# Patient Record
Sex: FEMALE | Race: OTHER | HISPANIC OR LATINO
[De-identification: names, ages, dates, MRNs, and addresses within clinical notes are randomized per-mention and may not be internally consistent; named-entity substitution may affect disease eponyms.]

---

## 2018-02-09 ENCOUNTER — APPOINTMENT (OUTPATIENT)
Dept: RADIOLOGY | Facility: CLINIC | Age: 48
End: 2018-02-09
Payer: COMMERCIAL

## 2018-02-09 ENCOUNTER — OUTPATIENT (OUTPATIENT)
Dept: OUTPATIENT SERVICES | Facility: HOSPITAL | Age: 48
LOS: 1 days | End: 2018-02-09
Payer: COMMERCIAL

## 2018-02-09 DIAGNOSIS — Z00.8 ENCOUNTER FOR OTHER GENERAL EXAMINATION: ICD-10-CM

## 2018-02-09 PROCEDURE — 73564 X-RAY EXAM KNEE 4 OR MORE: CPT

## 2018-02-09 PROCEDURE — 73564 X-RAY EXAM KNEE 4 OR MORE: CPT | Mod: 26,RT

## 2018-03-16 ENCOUNTER — APPOINTMENT (OUTPATIENT)
Dept: MRI IMAGING | Facility: CLINIC | Age: 48
End: 2018-03-16
Payer: COMMERCIAL

## 2018-03-16 ENCOUNTER — OUTPATIENT (OUTPATIENT)
Dept: OUTPATIENT SERVICES | Facility: HOSPITAL | Age: 48
LOS: 1 days | End: 2018-03-16
Payer: COMMERCIAL

## 2018-03-16 DIAGNOSIS — Z00.8 ENCOUNTER FOR OTHER GENERAL EXAMINATION: ICD-10-CM

## 2018-03-16 PROCEDURE — 73721 MRI JNT OF LWR EXTRE W/O DYE: CPT | Mod: 26,RT

## 2018-03-16 PROCEDURE — 73721 MRI JNT OF LWR EXTRE W/O DYE: CPT

## 2018-04-06 ENCOUNTER — RESULT REVIEW (OUTPATIENT)
Age: 48
End: 2018-04-06

## 2018-05-04 ENCOUNTER — APPOINTMENT (OUTPATIENT)
Dept: ULTRASOUND IMAGING | Facility: CLINIC | Age: 48
End: 2018-05-04
Payer: COMMERCIAL

## 2018-05-04 ENCOUNTER — OUTPATIENT (OUTPATIENT)
Dept: OUTPATIENT SERVICES | Facility: HOSPITAL | Age: 48
LOS: 1 days | End: 2018-05-04
Payer: COMMERCIAL

## 2018-05-04 ENCOUNTER — APPOINTMENT (OUTPATIENT)
Dept: MAMMOGRAPHY | Facility: CLINIC | Age: 48
End: 2018-05-04
Payer: COMMERCIAL

## 2018-05-04 DIAGNOSIS — Z00.8 ENCOUNTER FOR OTHER GENERAL EXAMINATION: ICD-10-CM

## 2018-05-04 PROCEDURE — 77066 DX MAMMO INCL CAD BI: CPT

## 2018-05-04 PROCEDURE — 76641 ULTRASOUND BREAST COMPLETE: CPT

## 2018-05-04 PROCEDURE — 77066 DX MAMMO INCL CAD BI: CPT | Mod: 26

## 2018-05-04 PROCEDURE — G0279: CPT | Mod: 26

## 2018-05-04 PROCEDURE — 76641 ULTRASOUND BREAST COMPLETE: CPT | Mod: 26,LT

## 2018-05-04 PROCEDURE — G0279: CPT

## 2020-05-05 ENCOUNTER — TRANSCRIPTION ENCOUNTER (OUTPATIENT)
Age: 50
End: 2020-05-05

## 2021-10-30 ENCOUNTER — TRANSCRIPTION ENCOUNTER (OUTPATIENT)
Age: 51
End: 2021-10-30

## 2021-10-30 ENCOUNTER — INPATIENT (INPATIENT)
Facility: HOSPITAL | Age: 51
LOS: 4 days | Discharge: HOME | DRG: 137 | End: 2021-11-04
Attending: INTERNAL MEDICINE | Admitting: FAMILY MEDICINE
Payer: MEDICAID

## 2021-10-30 VITALS — WEIGHT: 149.91 LBS | HEIGHT: 65 IN

## 2021-10-30 DIAGNOSIS — U07.1 COVID-19: ICD-10-CM

## 2021-10-30 LAB
ALBUMIN SERPL ELPH-MCNC: 3.4 G/DL — SIGNIFICANT CHANGE UP (ref 3.3–5)
ALP SERPL-CCNC: 116 U/L — SIGNIFICANT CHANGE UP (ref 40–120)
ALT FLD-CCNC: 160 U/L — HIGH (ref 12–78)
ANION GAP SERPL CALC-SCNC: 6 MMOL/L — SIGNIFICANT CHANGE UP (ref 5–17)
APPEARANCE UR: CLEAR — SIGNIFICANT CHANGE UP
AST SERPL-CCNC: 125 U/L — HIGH (ref 15–37)
BASOPHILS # BLD AUTO: 0.02 K/UL — SIGNIFICANT CHANGE UP (ref 0–0.2)
BASOPHILS NFR BLD AUTO: 0.3 % — SIGNIFICANT CHANGE UP (ref 0–2)
BILIRUB SERPL-MCNC: 0.4 MG/DL — SIGNIFICANT CHANGE UP (ref 0.2–1.2)
BILIRUB UR-MCNC: NEGATIVE — SIGNIFICANT CHANGE UP
BUN SERPL-MCNC: 5 MG/DL — LOW (ref 7–23)
CALCIUM SERPL-MCNC: 9 MG/DL — SIGNIFICANT CHANGE UP (ref 8.5–10.1)
CHLORIDE SERPL-SCNC: 107 MMOL/L — SIGNIFICANT CHANGE UP (ref 96–108)
CO2 SERPL-SCNC: 25 MMOL/L — SIGNIFICANT CHANGE UP (ref 22–31)
COLOR SPEC: YELLOW — SIGNIFICANT CHANGE UP
CREAT SERPL-MCNC: 0.81 MG/DL — SIGNIFICANT CHANGE UP (ref 0.5–1.3)
CRP SERPL-MCNC: 142 MG/L — HIGH
D DIMER BLD IA.RAPID-MCNC: <150 NG/ML DDU — SIGNIFICANT CHANGE UP
DIFF PNL FLD: NEGATIVE — SIGNIFICANT CHANGE UP
EOSINOPHIL # BLD AUTO: 0 K/UL — SIGNIFICANT CHANGE UP (ref 0–0.5)
EOSINOPHIL NFR BLD AUTO: 0 % — SIGNIFICANT CHANGE UP (ref 0–6)
FERRITIN SERPL-MCNC: 1728 NG/ML — HIGH (ref 15–150)
GLUCOSE SERPL-MCNC: 118 MG/DL — HIGH (ref 70–99)
GLUCOSE UR QL: 50 MG/DL
HCT VFR BLD CALC: 45.5 % — HIGH (ref 34.5–45)
HGB BLD-MCNC: 14.8 G/DL — SIGNIFICANT CHANGE UP (ref 11.5–15.5)
IMM GRANULOCYTES NFR BLD AUTO: 0.7 % — SIGNIFICANT CHANGE UP (ref 0–1.5)
KETONES UR-MCNC: NEGATIVE — SIGNIFICANT CHANGE UP
LEUKOCYTE ESTERASE UR-ACNC: NEGATIVE — SIGNIFICANT CHANGE UP
LYMPHOCYTES # BLD AUTO: 0.52 K/UL — LOW (ref 1–3.3)
LYMPHOCYTES # BLD AUTO: 7.2 % — LOW (ref 13–44)
MCHC RBC-ENTMCNC: 26.7 PG — LOW (ref 27–34)
MCHC RBC-ENTMCNC: 32.5 GM/DL — SIGNIFICANT CHANGE UP (ref 32–36)
MCV RBC AUTO: 82.1 FL — SIGNIFICANT CHANGE UP (ref 80–100)
MONOCYTES # BLD AUTO: 0.54 K/UL — SIGNIFICANT CHANGE UP (ref 0–0.9)
MONOCYTES NFR BLD AUTO: 7.5 % — SIGNIFICANT CHANGE UP (ref 2–14)
NEUTROPHILS # BLD AUTO: 6.05 K/UL — SIGNIFICANT CHANGE UP (ref 1.8–7.4)
NEUTROPHILS NFR BLD AUTO: 84.3 % — HIGH (ref 43–77)
NITRITE UR-MCNC: NEGATIVE — SIGNIFICANT CHANGE UP
PH UR: 7 — SIGNIFICANT CHANGE UP (ref 5–8)
PLATELET # BLD AUTO: 139 K/UL — LOW (ref 150–400)
POTASSIUM SERPL-MCNC: 4.2 MMOL/L — SIGNIFICANT CHANGE UP (ref 3.5–5.3)
POTASSIUM SERPL-SCNC: 4.2 MMOL/L — SIGNIFICANT CHANGE UP (ref 3.5–5.3)
PROCALCITONIN SERPL-MCNC: 0.44 NG/ML — HIGH (ref 0.02–0.1)
PROT SERPL-MCNC: 8.4 GM/DL — HIGH (ref 6–8.3)
PROT UR-MCNC: 30 MG/DL
RBC # BLD: 5.54 M/UL — HIGH (ref 3.8–5.2)
RBC # FLD: 12.7 % — SIGNIFICANT CHANGE UP (ref 10.3–14.5)
SODIUM SERPL-SCNC: 138 MMOL/L — SIGNIFICANT CHANGE UP (ref 135–145)
SP GR SPEC: 1 — LOW (ref 1.01–1.02)
UROBILINOGEN FLD QL: NEGATIVE MG/DL — SIGNIFICANT CHANGE UP
WBC # BLD: 7.18 K/UL — SIGNIFICANT CHANGE UP (ref 3.8–10.5)
WBC # FLD AUTO: 7.18 K/UL — SIGNIFICANT CHANGE UP (ref 3.8–10.5)

## 2021-10-30 PROCEDURE — 80069 RENAL FUNCTION PANEL: CPT

## 2021-10-30 PROCEDURE — 83036 HEMOGLOBIN GLYCOSYLATED A1C: CPT

## 2021-10-30 PROCEDURE — 86769 SARS-COV-2 COVID-19 ANTIBODY: CPT

## 2021-10-30 PROCEDURE — 83615 LACTATE (LD) (LDH) ENZYME: CPT

## 2021-10-30 PROCEDURE — 99223 1ST HOSP IP/OBS HIGH 75: CPT

## 2021-10-30 PROCEDURE — 85025 COMPLETE CBC W/AUTO DIFF WBC: CPT

## 2021-10-30 PROCEDURE — 85379 FIBRIN DEGRADATION QUANT: CPT

## 2021-10-30 PROCEDURE — 86140 C-REACTIVE PROTEIN: CPT

## 2021-10-30 PROCEDURE — 80076 HEPATIC FUNCTION PANEL: CPT

## 2021-10-30 PROCEDURE — 82728 ASSAY OF FERRITIN: CPT

## 2021-10-30 PROCEDURE — 80048 BASIC METABOLIC PNL TOTAL CA: CPT

## 2021-10-30 PROCEDURE — 93010 ELECTROCARDIOGRAM REPORT: CPT

## 2021-10-30 PROCEDURE — C9399: CPT

## 2021-10-30 PROCEDURE — 36415 COLL VENOUS BLD VENIPUNCTURE: CPT

## 2021-10-30 PROCEDURE — 71045 X-RAY EXAM CHEST 1 VIEW: CPT | Mod: 26

## 2021-10-30 PROCEDURE — 82565 ASSAY OF CREATININE: CPT

## 2021-10-30 PROCEDURE — 80053 COMPREHEN METABOLIC PANEL: CPT

## 2021-10-30 PROCEDURE — 85610 PROTHROMBIN TIME: CPT

## 2021-10-30 PROCEDURE — 99291 CRITICAL CARE FIRST HOUR: CPT

## 2021-10-30 PROCEDURE — 84145 PROCALCITONIN (PCT): CPT

## 2021-10-30 PROCEDURE — 82248 BILIRUBIN DIRECT: CPT

## 2021-10-30 RX ORDER — ENOXAPARIN SODIUM 100 MG/ML
70 INJECTION SUBCUTANEOUS EVERY 12 HOURS
Refills: 0 | Status: DISCONTINUED | OUTPATIENT
Start: 2021-10-30 | End: 2021-10-31

## 2021-10-30 RX ORDER — REMDESIVIR 5 MG/ML
200 INJECTION INTRAVENOUS EVERY 24 HOURS
Refills: 0 | Status: COMPLETED | OUTPATIENT
Start: 2021-10-30 | End: 2021-10-30

## 2021-10-30 RX ORDER — GUAIFENESIN/DEXTROMETHORPHAN 600MG-30MG
10 TABLET, EXTENDED RELEASE 12 HR ORAL EVERY 4 HOURS
Refills: 0 | Status: DISCONTINUED | OUTPATIENT
Start: 2021-10-30 | End: 2021-11-04

## 2021-10-30 RX ORDER — REMDESIVIR 5 MG/ML
INJECTION INTRAVENOUS
Refills: 0 | Status: COMPLETED | OUTPATIENT
Start: 2021-10-30 | End: 2021-11-03

## 2021-10-30 RX ORDER — ACETAMINOPHEN 500 MG
650 TABLET ORAL EVERY 6 HOURS
Refills: 0 | Status: DISCONTINUED | OUTPATIENT
Start: 2021-10-30 | End: 2021-11-04

## 2021-10-30 RX ORDER — REMDESIVIR 5 MG/ML
100 INJECTION INTRAVENOUS EVERY 24 HOURS
Refills: 0 | Status: COMPLETED | OUTPATIENT
Start: 2021-10-31 | End: 2021-11-03

## 2021-10-30 RX ORDER — ALBUTEROL 90 UG/1
2 AEROSOL, METERED ORAL EVERY 4 HOURS
Refills: 0 | Status: DISCONTINUED | OUTPATIENT
Start: 2021-10-30 | End: 2021-11-04

## 2021-10-30 RX ORDER — DEXAMETHASONE 0.5 MG/5ML
6 ELIXIR ORAL DAILY
Refills: 0 | Status: DISCONTINUED | OUTPATIENT
Start: 2021-10-30 | End: 2021-11-04

## 2021-10-30 RX ORDER — LANOLIN ALCOHOL/MO/W.PET/CERES
3 CREAM (GRAM) TOPICAL AT BEDTIME
Refills: 0 | Status: DISCONTINUED | OUTPATIENT
Start: 2021-10-30 | End: 2021-11-04

## 2021-10-30 RX ORDER — INFLUENZA VIRUS VACCINE 15; 15; 15; 15 UG/.5ML; UG/.5ML; UG/.5ML; UG/.5ML
0.5 SUSPENSION INTRAMUSCULAR ONCE
Refills: 0 | Status: DISCONTINUED | OUTPATIENT
Start: 2021-10-30 | End: 2021-11-04

## 2021-10-30 RX ORDER — DEXAMETHASONE 0.5 MG/5ML
6 ELIXIR ORAL ONCE
Refills: 0 | Status: COMPLETED | OUTPATIENT
Start: 2021-10-30 | End: 2021-10-30

## 2021-10-30 RX ORDER — ONDANSETRON 8 MG/1
4 TABLET, FILM COATED ORAL EVERY 8 HOURS
Refills: 0 | Status: DISCONTINUED | OUTPATIENT
Start: 2021-10-30 | End: 2021-11-04

## 2021-10-30 RX ADMIN — ENOXAPARIN SODIUM 70 MILLIGRAM(S): 100 INJECTION SUBCUTANEOUS at 22:09

## 2021-10-30 RX ADMIN — Medication 6 MILLIGRAM(S): at 18:03

## 2021-10-30 RX ADMIN — REMDESIVIR 580 MILLIGRAM(S): 5 INJECTION INTRAVENOUS at 22:09

## 2021-10-30 NOTE — H&P ADULT - ASSESSMENT
50 yo female with no significant PMHx. who was referred to the ER from Urgent care because of hypoxia SOB, She ws diagnosed with  COVID 19 , 10 days ago. She became increasingly SOB in the past 3 days.     assessment Dx:                       1. COVID 19 pneumonia                       2. COVID 19 infection                        3. Hypoxemic respiratory failure                                                 Plan:    admit to medicine               medications: started on COVID 19 management protocol with Remdesivir decadron, Lovenox               VTEP: Lovenox               Labs: cbc,bmp               Radiology:CXRay               Cardiac diagnostics: EKG               Consults: ID

## 2021-10-30 NOTE — ED PROVIDER NOTE - CONSTITUTIONAL, MLM
normal... Well appearing, awake, alert, oriented to person, place, time/situation and in no apparent distress. (+) 85% O2 sat on RA

## 2021-10-30 NOTE — ED ADULT NURSE NOTE - NSIMPLEMENTINTERV_GEN_ALL_ED
Implemented All Universal Safety Interventions:  [place] to call system. Call bell, personal items and telephone within reach. Instruct patient to call for assistance. Room bathroom lighting operational. Non-slip footwear when patient is off stretcher. Physically safe environment: no spills, clutter or unnecessary equipment. Stretcher in lowest position, wheels locked, appropriate side rails in place.

## 2021-10-30 NOTE — ED ADULT NURSE NOTE - OBJECTIVE STATEMENT
patient axox3, sent to  ED from urgent care c/o SOB, worsening. Pt was diagnosed with COVID x10 days. Pt reports her fever has abated but has become increasingly SOB over past 3 days. Pt has taken Tylenol and Theraflu for symptoms at home. Pt has not received monoclonal antibodies. Pt was sent to ED from urgent care because her O2 was low. Denies CP and headache. patient axox3, sent to  ED from urgent care c/o worsening SOB. patient was diagnosed with COVID x10 days ago. patient denies recent fevers but has become increasingly SOB over past 3 days. patient taking Tylenol and Theraflu for symptoms at home. patient denies headache, vision changes, n/v/d, chills, cough, chest pain. color good, skin warm and dry, respirations even and unlabored. patient able to speak in full sentences.

## 2021-10-30 NOTE — ED PROVIDER NOTE - OBJECTIVE STATEMENT
52 y/o F with no pertinent PMHx presents to the ED BIBA from urgent care c/o SOB, worsening. Pt was diagnosed with COVID x10 days. Pt reports her fever has abated but has become increasingly SOB over past 3 days. Pt has taken Tylenol and Theraflu for symptoms at home. Pt has not received monoclonal antibodies. Pt was sent to ED from urgent care because her O2 was low. Denies CP and headache.

## 2021-10-30 NOTE — ED PROVIDER NOTE - CARE PLAN
Principal Discharge DX:	Pneumonia due to COVID-19 virus  Secondary Diagnosis:	Hypoxemia  Secondary Diagnosis:	Dyspnea   1

## 2021-10-30 NOTE — H&P ADULT - HISTORY OF PRESENT ILLNESS
HPI: The patient is a 50 yo female with no significant PMHx. who was referred to the ER from Urgent care because of hypoxia SOB, She ws diagnosed with  COVID 19 , 10 days ago. She became increasingly SOB in the past 3 days.     PMHx: denies medical problems    PSHx: denies surgeries     Family Hx: denies chronic medical Hx. of her parents, sisters,    Social Hx.: not smoking, no alcohol use    ROS:   Eyes: no changes in vision    ENT/Mouth: no changes    Cardiovascular: no chest pain    Respiratory: has  SOB    Gastrointestinal: no diarrhea, no nausea, no vomiting    Genitourinary: no dysuria    Breast: no pain    Musculoskeletal: no pain    Integumentary: no itching    Neurological: No Headache, no tremor,    Psychiatric: no suicidal ideations    Endocrine: no excessive thirst,     Hematologic/Lymphatic: no swollen glands    Allergic/Immunologic: no itching      Physical Exam: Vital Signs Last 24 Hrs  T(C): 37.8 (30 Oct 2021 15:39), Max: 37.8 (30 Oct 2021 15:39)  T(F): 100.1 (30 Oct 2021 15:39), Max: 100.1 (30 Oct 2021 15:39)  HR: 99 (30 Oct 2021 15:39) (97 - 99)  BP: 120/72 (30 Oct 2021 15:39) (120/72 - 120/72)  BP(mean): 86 (30 Oct 2021 15:39) (86 - 86)  RR: 25 (30 Oct 2021 15:47) (25 - 27)  SpO2: 93% (30 Oct 2021 15:47) (91% - 99%)        HEENT: PRRL EOMI    MOUTH/TEETH/GUMS: Clear    NECK: no JVD    LUNGS: bibasilar rales R >L     HEART: S1,S2 RR     ABDOMEN: soft nontender    EXTREMITIES:  no pedal edema    MUSCULOSKELETAL: no joint swelling     NEURO: no tremor, no focal signs.    SKIN: no rash    : CVA negative,     Lab:                       14.8   7.18  )-----------( 139      ( 30 Oct 2021 16:09 )             45.5       10-30    138  |  107  |  5<L>  ----------------------------<  118<H>  4.2   |  25  |  0.81    Ca    9.0      30 Oct 2021 16:09    TPro  8.4<H>  /  Alb  3.4  /  TBili  0.4  /  DBili  x   /  AST  125<H>  /  ALT  160<H>  /  AlkPhos  116  10-30    CXRay : scattered bilateral alveolar infiltrates

## 2021-10-31 LAB
A1C WITH ESTIMATED AVERAGE GLUCOSE RESULT: 6.1 % — HIGH (ref 4–5.6)
ANION GAP SERPL CALC-SCNC: 8 MMOL/L — SIGNIFICANT CHANGE UP (ref 5–17)
BASOPHILS # BLD AUTO: 0 K/UL — SIGNIFICANT CHANGE UP (ref 0–0.2)
BASOPHILS NFR BLD AUTO: 0 % — SIGNIFICANT CHANGE UP (ref 0–2)
BUN SERPL-MCNC: 11 MG/DL — SIGNIFICANT CHANGE UP (ref 7–23)
CALCIUM SERPL-MCNC: 8.7 MG/DL — SIGNIFICANT CHANGE UP (ref 8.5–10.1)
CHLORIDE SERPL-SCNC: 107 MMOL/L — SIGNIFICANT CHANGE UP (ref 96–108)
CO2 SERPL-SCNC: 25 MMOL/L — SIGNIFICANT CHANGE UP (ref 22–31)
COVID-19 SPIKE DOMAIN AB INTERP: NEGATIVE — SIGNIFICANT CHANGE UP
COVID-19 SPIKE DOMAIN ANTIBODY RESULT: 0.69 U/ML — SIGNIFICANT CHANGE UP
CREAT SERPL-MCNC: 0.75 MG/DL — SIGNIFICANT CHANGE UP (ref 0.5–1.3)
EOSINOPHIL # BLD AUTO: 0 K/UL — SIGNIFICANT CHANGE UP (ref 0–0.5)
EOSINOPHIL NFR BLD AUTO: 0 % — SIGNIFICANT CHANGE UP (ref 0–6)
ESTIMATED AVERAGE GLUCOSE: 128 MG/DL — HIGH (ref 68–114)
GLUCOSE SERPL-MCNC: 163 MG/DL — HIGH (ref 70–99)
HCT VFR BLD CALC: 41 % — SIGNIFICANT CHANGE UP (ref 34.5–45)
HGB BLD-MCNC: 13.4 G/DL — SIGNIFICANT CHANGE UP (ref 11.5–15.5)
IMM GRANULOCYTES NFR BLD AUTO: 0.4 % — SIGNIFICANT CHANGE UP (ref 0–1.5)
INR BLD: 1.14 RATIO — SIGNIFICANT CHANGE UP (ref 0.88–1.16)
LYMPHOCYTES # BLD AUTO: 0.47 K/UL — LOW (ref 1–3.3)
LYMPHOCYTES # BLD AUTO: 9.6 % — LOW (ref 13–44)
MCHC RBC-ENTMCNC: 26.6 PG — LOW (ref 27–34)
MCHC RBC-ENTMCNC: 32.7 GM/DL — SIGNIFICANT CHANGE UP (ref 32–36)
MCV RBC AUTO: 81.3 FL — SIGNIFICANT CHANGE UP (ref 80–100)
MONOCYTES # BLD AUTO: 0.53 K/UL — SIGNIFICANT CHANGE UP (ref 0–0.9)
MONOCYTES NFR BLD AUTO: 10.8 % — SIGNIFICANT CHANGE UP (ref 2–14)
NEUTROPHILS # BLD AUTO: 3.9 K/UL — SIGNIFICANT CHANGE UP (ref 1.8–7.4)
NEUTROPHILS NFR BLD AUTO: 79.2 % — HIGH (ref 43–77)
PLATELET # BLD AUTO: 167 K/UL — SIGNIFICANT CHANGE UP (ref 150–400)
POTASSIUM SERPL-MCNC: 4.1 MMOL/L — SIGNIFICANT CHANGE UP (ref 3.5–5.3)
POTASSIUM SERPL-SCNC: 4.1 MMOL/L — SIGNIFICANT CHANGE UP (ref 3.5–5.3)
PROTHROM AB SERPL-ACNC: 13.2 SEC — SIGNIFICANT CHANGE UP (ref 10.6–13.6)
RBC # BLD: 5.04 M/UL — SIGNIFICANT CHANGE UP (ref 3.8–5.2)
RBC # FLD: 12.7 % — SIGNIFICANT CHANGE UP (ref 10.3–14.5)
SARS-COV-2 IGG+IGM SERPL QL IA: 0.69 U/ML — SIGNIFICANT CHANGE UP
SARS-COV-2 IGG+IGM SERPL QL IA: NEGATIVE — SIGNIFICANT CHANGE UP
SODIUM SERPL-SCNC: 140 MMOL/L — SIGNIFICANT CHANGE UP (ref 135–145)
WBC # BLD: 4.92 K/UL — SIGNIFICANT CHANGE UP (ref 3.8–10.5)
WBC # FLD AUTO: 4.92 K/UL — SIGNIFICANT CHANGE UP (ref 3.8–10.5)

## 2021-10-31 PROCEDURE — 99233 SBSQ HOSP IP/OBS HIGH 50: CPT

## 2021-10-31 RX ORDER — ENOXAPARIN SODIUM 100 MG/ML
40 INJECTION SUBCUTANEOUS DAILY
Refills: 0 | Status: DISCONTINUED | OUTPATIENT
Start: 2021-10-31 | End: 2021-11-04

## 2021-10-31 RX ORDER — PANTOPRAZOLE SODIUM 20 MG/1
40 TABLET, DELAYED RELEASE ORAL
Refills: 0 | Status: DISCONTINUED | OUTPATIENT
Start: 2021-10-31 | End: 2021-11-04

## 2021-10-31 RX ADMIN — REMDESIVIR 540 MILLIGRAM(S): 5 INJECTION INTRAVENOUS at 22:00

## 2021-10-31 RX ADMIN — ENOXAPARIN SODIUM 40 MILLIGRAM(S): 100 INJECTION SUBCUTANEOUS at 18:18

## 2021-10-31 RX ADMIN — Medication 6 MILLIGRAM(S): at 12:07

## 2021-10-31 RX ADMIN — PANTOPRAZOLE SODIUM 40 MILLIGRAM(S): 20 TABLET, DELAYED RELEASE ORAL at 18:19

## 2021-10-31 NOTE — CONSULT NOTE ADULT - SUBJECTIVE AND OBJECTIVE BOX
Patient is a 51y old  Female who presents with a chief complaint of COVID 19 infection. (30 Oct 2021 19:37)    HPI:  HPI: The patient is a 50 yo female with no significant PMHx. who was referred to the ER from Urgent care because of hypoxia SOB, She ws diagnosed with  COVID 19 , 10 days ago. She became increasingly SOB in the past 3 days. Here bilateral infiltrates on xray, positive for covid-19 was given remdesivir/decadron.       PMH: as above  PSH: as above  Meds: per reconciliation sheet, noted below  MEDICATIONS  (STANDING):  dexAMETHasone  Injectable 6 milliGRAM(s) IV Push daily  enoxaparin Injectable 40 milliGRAM(s) SubCutaneous daily  influenza   Vaccine 0.5 milliLiter(s) IntraMuscular once  pantoprazole    Tablet 40 milliGRAM(s) Oral before breakfast  remdesivir  IVPB 100 milliGRAM(s) IV Intermittent every 24 hours  remdesivir  IVPB   IV Intermittent     Allergies    No Known Allergies    Intolerances      Social: no smoking, no alcohol, no illegal drugs; no recent travel, no exposure to TB  FAMILY HISTORY:     no history of premature cardiovascular disease in first degree relatives    ROS: no HA, no dizziness, no sore throat, no blurry vision, no CP, no palpitations, no abdominal pain, no diarrhea, no N/V, no dysuria, no leg pain, no claudication, no rash, no joint aches, no rectal pain or bleeding, no night sweats    All other systems reviewed and are negative    Vital Signs Last 24 Hrs  T(C): 36.7 (31 Oct 2021 09:36), Max: 37.9 (30 Oct 2021 20:45)  T(F): 98.1 (31 Oct 2021 09:36), Max: 100.3 (30 Oct 2021 20:45)  HR: 81 (31 Oct 2021 09:36) (81 - 99)  BP: 108/63 (31 Oct 2021 09:36) (108/63 - 120/72)  BP(mean): 82 (30 Oct 2021 20:45) (82 - 86)  RR: 19 (31 Oct 2021 09:36) (19 - 27)  SpO2: 98% (30 Oct 2021 21:15) (91% - 99%)  Daily Height in cm: 165.1 (30 Oct 2021 15:08)    Daily     PE:  Constitutional: frail looking  HEENT: NC/AT, EOMI, PERRLA, conjunctivae clear; ears and nose atraumatic; pharynx benign  Neck: supple; thyroid not palpable  Back: no tenderness  Respiratory: decreased breath sounds, crackles   Cardiovascular: S1S2 regular, no murmurs  Abdomen: soft, not tender, not distended, positive BS; liver and spleen WNL  Genitourinary: no suprapubic tenderness  Lymphatic: no LN palpable  Musculoskeletal: no muscle tenderness, no joint swelling or tenderness  Extremities: no pedal edema  Neurological/ Psychiatric: AxOx3, Judgement and insight normal;  moving all extremities  Skin: no rashes; no palpable lesions    Labs: all available labs reviewed                        13.4   4.92  )-----------( 167      ( 31 Oct 2021 07:05 )             41.0     10-31    140  |  107  |  11  ----------------------------<  163<H>  4.1   |  25  |  0.75    Ca    8.7      31 Oct 2021 07:05  Phos  2.9     10-31    TPro  x   /  Alb  3.0<L>  /  TBili  x   /  DBili  x   /  AST  x   /  ALT  x   /  AlkPhos  x   10-31     LIVER FUNCTIONS - ( 31 Oct 2021 07:05 )  Alb: 3.0 g/dL / Pro: x     / ALK PHOS: x     / ALT: x     / AST: x     / GGT: x           Urinalysis Basic - ( 30 Oct 2021 16:09 )    Color: Yellow / Appearance: Clear / S.005 / pH: x  Gluc: x / Ketone: Negative  / Bili: Negative / Urobili: Negative mg/dL   Blood: x / Protein: 30 mg/dL / Nitrite: Negative   Leuk Esterase: Negative / RBC: 0-2 /HPF / WBC 0-2   Sq Epi: x / Non Sq Epi: Occasional / Bacteria: Occasional          Radiology: all available radiological tests reviewed      EXAM:  XR CHEST PORTABLE URGENT 1V                            PROCEDURE DATE:  10/30/2021          INTERPRETATION:  History: Dyspnea cough fever COVID    Chest:  one view.    Comparison: No prior    AP radiograph of the chest demonstrates scatteredBILATERAL alveolar infiltrates. The cardiac silhouette is normal in size. Osseous structures are intact.    Impression:scattered BILATERAL alveolar infiltrates    --- End of Report ---      < end of copied text >    Advanced directives addressed: full resuscitation

## 2021-10-31 NOTE — CONSULT NOTE ADULT - ASSESSMENT
52 yo female with no significant PMHx. who was referred to the ER from Urgent care because of hypoxia SOB, She ws diagnosed with  COVID 19 , 10 days ago. She became increasingly SOB in the past 3 days. Here bilateral infiltrates on xray, positive for covid-19 was given remdesivir/decadron.     1. acute respiratory failure. covid 19 viral syndrome. multifocal pneumonia.   - agree with remdesivir/decadron  - monitor renal/hepatic function closely  - observe off antibiotics   - monitor temps  - isolation precautions  - supportive care  - fu cbc    2. other issues - care per medicine

## 2021-11-01 LAB
ALBUMIN SERPL ELPH-MCNC: 2.8 G/DL — LOW (ref 3.3–5)
ALP SERPL-CCNC: 85 U/L — SIGNIFICANT CHANGE UP (ref 40–120)
ALT FLD-CCNC: 93 U/L — HIGH (ref 12–78)
ANION GAP SERPL CALC-SCNC: 7 MMOL/L — SIGNIFICANT CHANGE UP (ref 5–17)
AST SERPL-CCNC: 59 U/L — HIGH (ref 15–37)
BILIRUB SERPL-MCNC: 0.4 MG/DL — SIGNIFICANT CHANGE UP (ref 0.2–1.2)
BUN SERPL-MCNC: 15 MG/DL — SIGNIFICANT CHANGE UP (ref 7–23)
CALCIUM SERPL-MCNC: 8.5 MG/DL — SIGNIFICANT CHANGE UP (ref 8.5–10.1)
CHLORIDE SERPL-SCNC: 107 MMOL/L — SIGNIFICANT CHANGE UP (ref 96–108)
CO2 SERPL-SCNC: 25 MMOL/L — SIGNIFICANT CHANGE UP (ref 22–31)
CREAT SERPL-MCNC: 0.79 MG/DL — SIGNIFICANT CHANGE UP (ref 0.5–1.3)
CRP SERPL-MCNC: 85 MG/L — HIGH
D DIMER BLD IA.RAPID-MCNC: <150 NG/ML DDU — SIGNIFICANT CHANGE UP
FERRITIN SERPL-MCNC: 1544 NG/ML — HIGH (ref 15–150)
GLUCOSE SERPL-MCNC: 142 MG/DL — HIGH (ref 70–99)
INR BLD: 1.15 RATIO — SIGNIFICANT CHANGE UP (ref 0.88–1.16)
LDH SERPL L TO P-CCNC: 407 U/L — HIGH (ref 84–241)
POTASSIUM SERPL-MCNC: 4.3 MMOL/L — SIGNIFICANT CHANGE UP (ref 3.5–5.3)
POTASSIUM SERPL-SCNC: 4.3 MMOL/L — SIGNIFICANT CHANGE UP (ref 3.5–5.3)
PROT SERPL-MCNC: 6.9 GM/DL — SIGNIFICANT CHANGE UP (ref 6–8.3)
PROTHROM AB SERPL-ACNC: 13.3 SEC — SIGNIFICANT CHANGE UP (ref 10.6–13.6)
SODIUM SERPL-SCNC: 139 MMOL/L — SIGNIFICANT CHANGE UP (ref 135–145)

## 2021-11-01 PROCEDURE — 99233 SBSQ HOSP IP/OBS HIGH 50: CPT

## 2021-11-01 RX ADMIN — Medication 6 MILLIGRAM(S): at 10:25

## 2021-11-01 RX ADMIN — REMDESIVIR 540 MILLIGRAM(S): 5 INJECTION INTRAVENOUS at 21:51

## 2021-11-01 RX ADMIN — PANTOPRAZOLE SODIUM 40 MILLIGRAM(S): 20 TABLET, DELAYED RELEASE ORAL at 05:43

## 2021-11-01 RX ADMIN — ENOXAPARIN SODIUM 40 MILLIGRAM(S): 100 INJECTION SUBCUTANEOUS at 10:20

## 2021-11-02 LAB
ALBUMIN SERPL ELPH-MCNC: 2.9 G/DL — LOW (ref 3.3–5)
ALP SERPL-CCNC: 84 U/L — SIGNIFICANT CHANGE UP (ref 40–120)
ALT FLD-CCNC: 129 U/L — HIGH (ref 12–78)
AST SERPL-CCNC: 83 U/L — HIGH (ref 15–37)
BILIRUB DIRECT SERPL-MCNC: 0.1 MG/DL — SIGNIFICANT CHANGE UP (ref 0–0.2)
BILIRUB INDIRECT FLD-MCNC: 0.3 MG/DL — SIGNIFICANT CHANGE UP (ref 0.2–1)
BILIRUB SERPL-MCNC: 0.4 MG/DL — SIGNIFICANT CHANGE UP (ref 0.2–1.2)
CREAT SERPL-MCNC: 0.89 MG/DL — SIGNIFICANT CHANGE UP (ref 0.5–1.3)
INR BLD: 1.22 RATIO — HIGH (ref 0.88–1.16)
PROT SERPL-MCNC: 6.8 GM/DL — SIGNIFICANT CHANGE UP (ref 6–8.3)
PROTHROM AB SERPL-ACNC: 14.1 SEC — HIGH (ref 10.6–13.6)

## 2021-11-02 PROCEDURE — 99232 SBSQ HOSP IP/OBS MODERATE 35: CPT

## 2021-11-02 RX ADMIN — REMDESIVIR 540 MILLIGRAM(S): 5 INJECTION INTRAVENOUS at 22:41

## 2021-11-02 RX ADMIN — PANTOPRAZOLE SODIUM 40 MILLIGRAM(S): 20 TABLET, DELAYED RELEASE ORAL at 06:23

## 2021-11-02 RX ADMIN — ENOXAPARIN SODIUM 40 MILLIGRAM(S): 100 INJECTION SUBCUTANEOUS at 09:24

## 2021-11-02 RX ADMIN — Medication 6 MILLIGRAM(S): at 09:24

## 2021-11-03 ENCOUNTER — TRANSCRIPTION ENCOUNTER (OUTPATIENT)
Age: 51
End: 2021-11-03

## 2021-11-03 LAB
ALBUMIN SERPL ELPH-MCNC: 3 G/DL — LOW (ref 3.3–5)
ALP SERPL-CCNC: 85 U/L — SIGNIFICANT CHANGE UP (ref 40–120)
ALT FLD-CCNC: 133 U/L — HIGH (ref 12–78)
AST SERPL-CCNC: 70 U/L — HIGH (ref 15–37)
BILIRUB DIRECT SERPL-MCNC: 0.2 MG/DL — SIGNIFICANT CHANGE UP (ref 0–0.2)
BILIRUB INDIRECT FLD-MCNC: 0.4 MG/DL — SIGNIFICANT CHANGE UP (ref 0.2–1)
BILIRUB SERPL-MCNC: 0.6 MG/DL — SIGNIFICANT CHANGE UP (ref 0.2–1.2)
CREAT SERPL-MCNC: 0.87 MG/DL — SIGNIFICANT CHANGE UP (ref 0.5–1.3)
INR BLD: 1.28 RATIO — HIGH (ref 0.88–1.16)
PROT SERPL-MCNC: 6.8 GM/DL — SIGNIFICANT CHANGE UP (ref 6–8.3)
PROTHROM AB SERPL-ACNC: 14.7 SEC — HIGH (ref 10.6–13.6)

## 2021-11-03 PROCEDURE — 99232 SBSQ HOSP IP/OBS MODERATE 35: CPT

## 2021-11-03 RX ADMIN — Medication 650 MILLIGRAM(S): at 22:29

## 2021-11-03 RX ADMIN — PANTOPRAZOLE SODIUM 40 MILLIGRAM(S): 20 TABLET, DELAYED RELEASE ORAL at 06:22

## 2021-11-03 RX ADMIN — REMDESIVIR 540 MILLIGRAM(S): 5 INJECTION INTRAVENOUS at 22:30

## 2021-11-03 RX ADMIN — Medication 650 MILLIGRAM(S): at 23:29

## 2021-11-03 RX ADMIN — Medication 6 MILLIGRAM(S): at 10:33

## 2021-11-03 RX ADMIN — ENOXAPARIN SODIUM 40 MILLIGRAM(S): 100 INJECTION SUBCUTANEOUS at 10:33

## 2021-11-03 NOTE — DISCHARGE NOTE PROVIDER - PROVIDER TOKENS
FREE:[LAST:[.],PHONE:[(   )    -],FAX:[(   )    -],ADDRESS:[Follow up at [facility]]]

## 2021-11-03 NOTE — DISCHARGE NOTE PROVIDER - NSDCCPCAREPLAN_GEN_ALL_CORE_FT
PRINCIPAL DISCHARGE DIAGNOSIS  Diagnosis: Pneumonia due to COVID-19 virus  Assessment and Plan of Treatment:   *Self isolate for 10 days total from symptom onset.   *use your oxygen 24/7 for now until you no longer require it.   *keep your oxygen consistently above 92% while ambulatory and at rest.   *use your pulse oximeter to test your oxgyen frequently. When your oxygen is above 92-93%  WITHOUT THE OXYGEN when walking around, then you can stop using your oxygen support.    *use your albuterol inhlaler when you feels short of breath or wheezy  *use your Tessalon perlles for couging releif.   *Follow up with primary care doctor in one week / or when isolation period is finsihed.   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  *Gradually resume activity over the next 1-2 weeks.        SECONDARY DISCHARGE DIAGNOSES  Diagnosis: Hypoxemia  Assessment and Plan of Treatment:     Diagnosis: Dyspnea  Assessment and Plan of Treatment:

## 2021-11-03 NOTE — DISCHARGE NOTE PROVIDER - NSDCMRMEDTOKEN_GEN_ALL_CORE_FT
aspirin 81 mg oral tablet: 1 tab(s) orally once a day  Theraflu Daytime Severe Cold &amp; Cough oral tablet: 2 tab(s) orally every 4 hours, As Needed  Tylenol 500 mg oral tablet: 2 tab(s) orally every 6 hours

## 2021-11-03 NOTE — DISCHARGE NOTE PROVIDER - CARE PROVIDER_API CALL
.,   Follow up at [facility]  Phone: (   )    -  Fax: (   )    -  Follow Up Time:

## 2021-11-03 NOTE — DISCHARGE NOTE NURSING/CASE MANAGEMENT/SOCIAL WORK - PATIENT PORTAL LINK FT
You can access the FollowMyHealth Patient Portal offered by [facility] by registering at the following website: [email or web address]. By joining [facility]’s FollowMyHealth portal, you will also be able to view your health information using other applications (apps) compatible with our system.

## 2021-11-03 NOTE — DISCHARGE NOTE PROVIDER - HOSPITAL COURSE
Vital Signs Last 24 Hrs  T(C): 36.7 (03 Nov 2021 08:16), Max: 36.7 (02 Nov 2021 19:30)  T(F): 98 (03 Nov 2021 08:16), Max: 98 (02 Nov 2021 19:30)  HR: 65 (03 Nov 2021 00:10) (65 - 66)  BP: 99/61 (03 Nov 2021 08:16) (99/61 - 121/55)  BP(mean): --  RR: 18 (03 Nov 2021 08:16) (18 - 18)  SpO2: 100% (03 Nov 2021 08:16) (98% - 100%)    HEENT:   pupils equal and reactive, EOMI, no oropharyngeal lesions, erythema, exudates, oral thrush    NECK:   supple, no carotid bruits, no palpable lymph nodes, no thyromegaly    CV:  +S1, +S2, regular, no murmurs or rubs    RESP:   lungs clear to auscultation bilaterally, no wheezing, rales, rhonchi, good air entry bilaterally    BREAST:  not examined    GI:  abdomen soft, non-tender, non-distended, normal BS, no bruits, no abdominal masses, no palpable masses    RECTAL:  not examined    :  not examined    MSK:   normal muscle tone, no atrophy, no rigidity, no contractions    EXT:   no clubbing, no cyanosis, no edema, no calf pain, swelling or erythema    VASCULAR:  pulses equal and symmetric in the upper and lower extremities    NEURO:  AAOX3, no focal neurological deficits, follows all commands, able to move extremities spontaneously    SKIN:  no ulcers, lesions or rashes    03 Nov 2021 09:10    x      |  x      |  x      ----------------------------<  x      x       |  x      |  0.87       TPro  6.8    /  Alb  3.0    /  TBili  0.6    /  DBili  0.2    /  AST  70     /  ALT  133    /  AlkPhos  85     03 Nov 2021 09:10  LIVER FUNCTIONS - ( 03 Nov 2021 09:10 )  Alb: 3.0 g/dL / Pro: 6.8 gm/dL / ALK PHOS: 85 U/L / ALT: 133 U/L / AST: 70 U/L / GGT: x         PT/INR - ( 03 Nov 2021 09:10 )   PT: 14.7 sec;   INR: 1.28 ratio             Hospital Course:    52 yo female with no significant PMHx admitted with COVID PNA.      #Acute Hypoxic Respiratory failure 2/2 COVID19:    Cont present care.    Remdesivir/ decadron day #5/5.    Pulse ox changed to forehead (she has acrylic nails) and titrated down to NC 2 L, currently 100%  Unvaccinated.  Antibodies negative.    patient stable , will return home with supplemental o2 tank.    Vital Signs Last 24 Hrs  T(C): 36.7 (03 Nov 2021 08:16), Max: 36.7 (02 Nov 2021 19:30)  T(F): 98 (03 Nov 2021 08:16), Max: 98 (02 Nov 2021 19:30)  HR: 65 (03 Nov 2021 00:10) (65 - 66)  BP: 99/61 (03 Nov 2021 08:16) (99/61 - 121/55)  BP(mean): --  RR: 18 (03 Nov 2021 08:16) (18 - 18)  SpO2: 100% (03 Nov 2021 08:16) (98% - 100%)    HEENT:   pupils equal and reactive, EOMI, no oropharyngeal lesions, erythema, exudates, oral thrush    NECK:   supple, no carotid bruits, no palpable lymph nodes, no thyromegaly    CV:  +S1, +S2, regular, no murmurs or rubs    RESP:   lungs clear to auscultation bilaterally, no wheezing, rales, rhonchi, good air entry bilaterally    BREAST:  not examined    GI:  abdomen soft, non-tender, non-distended, normal BS, no bruits, no abdominal masses, no palpable masses    RECTAL:  not examined    :  not examined    MSK:   normal muscle tone, no atrophy, no rigidity, no contractions    EXT:   no clubbing, no cyanosis, no edema, no calf pain, swelling or erythema    VASCULAR:  pulses equal and symmetric in the upper and lower extremities    NEURO:  AAOX3, no focal neurological deficits, follows all commands, able to move extremities spontaneously    SKIN:  no ulcers, lesions or rashes    03 Nov 2021 09:10    x      |  x      |  x      ----------------------------<  x      x       |  x      |  0.87       TPro  6.8    /  Alb  3.0    /  TBili  0.6    /  DBili  0.2    /  AST  70     /  ALT  133    /  AlkPhos  85     03 Nov 2021 09:10  LIVER FUNCTIONS - ( 03 Nov 2021 09:10 )  Alb: 3.0 g/dL / Pro: 6.8 gm/dL / ALK PHOS: 85 U/L / ALT: 133 U/L / AST: 70 U/L / GGT: x         PT/INR - ( 03 Nov 2021 09:10 )   PT: 14.7 sec;   INR: 1.28 ratio             Hospital Course:    52 yo female with no significant PMHx admitted with COVID PNA.      #Acute Hypoxic Respiratory failure 2/2 COVID19:    Cont present care.    Remdesivir/ decadron day #5/5.    Pulse ox changed to forehead (she has acrylic nails) and titrated down to NC 2 L, currently 100%  Unvaccinated.  Antibodies negative.    patient stable , will return home

## 2021-11-04 VITALS
OXYGEN SATURATION: 94 % | HEART RATE: 77 BPM | DIASTOLIC BLOOD PRESSURE: 54 MMHG | SYSTOLIC BLOOD PRESSURE: 94 MMHG | TEMPERATURE: 99 F | RESPIRATION RATE: 18 BRPM

## 2021-11-04 LAB
ALBUMIN SERPL ELPH-MCNC: 3.1 G/DL — LOW (ref 3.3–5)
ALP SERPL-CCNC: 88 U/L — SIGNIFICANT CHANGE UP (ref 40–120)
ALT FLD-CCNC: 135 U/L — HIGH (ref 12–78)
AST SERPL-CCNC: 68 U/L — HIGH (ref 15–37)
BILIRUB DIRECT SERPL-MCNC: 0.2 MG/DL — SIGNIFICANT CHANGE UP (ref 0–0.2)
BILIRUB INDIRECT FLD-MCNC: 0.4 MG/DL — SIGNIFICANT CHANGE UP (ref 0.2–1)
BILIRUB SERPL-MCNC: 0.6 MG/DL — SIGNIFICANT CHANGE UP (ref 0.2–1.2)
CREAT SERPL-MCNC: 0.84 MG/DL — SIGNIFICANT CHANGE UP (ref 0.5–1.3)
INR BLD: 1.27 RATIO — HIGH (ref 0.88–1.16)
PROT SERPL-MCNC: 7.1 GM/DL — SIGNIFICANT CHANGE UP (ref 6–8.3)
PROTHROM AB SERPL-ACNC: 14.7 SEC — HIGH (ref 10.6–13.6)

## 2021-11-04 PROCEDURE — 99239 HOSP IP/OBS DSCHRG MGMT >30: CPT

## 2021-11-04 RX ADMIN — PANTOPRAZOLE SODIUM 40 MILLIGRAM(S): 20 TABLET, DELAYED RELEASE ORAL at 06:00

## 2021-11-04 RX ADMIN — Medication 6 MILLIGRAM(S): at 09:33

## 2021-11-04 RX ADMIN — ENOXAPARIN SODIUM 40 MILLIGRAM(S): 100 INJECTION SUBCUTANEOUS at 09:32

## 2021-11-04 NOTE — PROGRESS NOTE ADULT - SUBJECTIVE AND OBJECTIVE BOX
Reason for Admission: COVID 19 infection.    History of Present Illness:   HPI: The patient is a 50 yo female with no significant PMHx. who was referred to the ER from Urgent care because of hypoxia SOB, She ws diagnosed with  COVID 19 , 10 days ago. She became increasingly SOB in the past 3 days.     10/31 - breathing better today.  requiring 50% VM +cough    ROS:   All 10 systems reviewed and found to be negative with the exception of what has been described above.    Vital Signs Last 24 Hrs  T(C): 36.7 (31 Oct 2021 09:36), Max: 37.9 (30 Oct 2021 20:45)  T(F): 98.1 (31 Oct 2021 09:36), Max: 100.3 (30 Oct 2021 20:45)  HR: 81 (31 Oct 2021 09:36) (81 - 99)  BP: 108/63 (31 Oct 2021 09:36) (108/63 - 120/72)  BP(mean): 82 (30 Oct 2021 20:45) (82 - 86)  RR: 19 (31 Oct 2021 09:36) (19 - 27)  SpO2: 98% (30 Oct 2021 21:15) (91% - 99%)    GEN: lying in bed, mild distress  HEENT:   NC/AT  CV:  +S1, +S2, RRR  RESP:   lungs clear to auscultation bilaterally, no wheeze, rales, rhonchi   BREAST:  not examined  GI:  abdomen soft, non-tender, non-distended, normoactive BS  EXT:  no edema  NEURO:  AAOX3, no focal neurological deficits  SKIN:  no rashes                                13.4   4.92  )-----------( 167      ( 31 Oct 2021 07:05 )             41.0     10-31    140  |  107  |  11  ----------------------------<  163<H>  4.1   |  25  |  0.75    Ca    8.7      31 Oct 2021 07:05  Phos  2.9     10-31    TPro  x   /  Alb  3.0<L>  /  TBili  x   /  DBili  x   /  AST  x   /  ALT  x   /  AlkPhos  x   10-31        LIVER FUNCTIONS - ( 31 Oct 2021 07:05 )  Alb: 3.0 g/dL / Pro: x     / ALK PHOS: x     / ALT: x     / AST: x     / GGT: x           PT/INR - ( 31 Oct 2021 07:05 )   PT: 13.2 sec;   INR: 1.14 ratio           Urinalysis Basic - ( 30 Oct 2021 16:09 )    Color: Yellow / Appearance: Clear / S.005 / pH: x  Gluc: x / Ketone: Negative  / Bili: Negative / Urobili: Negative mg/dL   Blood: x / Protein: 30 mg/dL / Nitrite: Negative   Leuk Esterase: Negative / RBC: 0-2 /HPF / WBC 0-2   Sq Epi: x / Non Sq Epi: Occasional / Bacteria: Occasional    < from: Xray Chest 1 View- PORTABLE-Urgent (10.30.21 @ 16:19) >  AP radiograph of the chest demonstrates scatteredBILATERAL alveolar infiltrates. The cardiac silhouette is normal in size. Osseous structures are intact.    Impression:scattered BILATERAL alveolar infiltrates    < end of copied text >        50 yo female with no significant PMHx. who was referred to the ER from Urgent care because of hypoxia SOB, She ws diagnosed with  COVID 19 , 10 days ago. She became increasingly SOB in the past 3 days.     assessment Dx:                       1. COVID 19 pneumonia                       2. COVID 19 infection                        3. Hypoxemic respiratory failure                         - pt remians stable overnight but still requiring 50% VM  - continue rem/dex day 2  - ID eval pending  - f/u labs  - given normal d-dimer will adjust Lovenox to 40 mg subcut daily   - GI proph     dispo - home in 3-4 days     
  Date of service: 21 @ 13:39    pt seen and examined  feels sob, weak  on NRB  O2 sats > 90s  afebrile    ROS: no fever or chills; denies dizziness, no HA, no abdominal pain, no diarrhea or constipation; no dysuria, no urinary frequency, no legs pain, no rashes    MEDICATIONS  (STANDING):  dexAMETHasone  Injectable 6 milliGRAM(s) IV Push daily  enoxaparin Injectable 40 milliGRAM(s) SubCutaneous daily  influenza   Vaccine 0.5 milliLiter(s) IntraMuscular once  pantoprazole    Tablet 40 milliGRAM(s) Oral before breakfast  remdesivir  IVPB 100 milliGRAM(s) IV Intermittent every 24 hours  remdesivir  IVPB   IV Intermittent     Vital Signs Last 24 Hrs  T(C): 37.1 (31 Oct 2021 21:02), Max: 37.1 (31 Oct 2021 21:02)  T(F): 98.7 (31 Oct 2021 21:02), Max: 98.7 (31 Oct 2021 21:02)  HR: 77 (31 Oct 2021 21:02) (77 - 77)  BP: 109/63 (31 Oct 2021 21:02) (109/63 - 109/63)  BP(mean): --  RR: --  SpO2: 95% (31 Oct 2021 21:02) (95% - 95%)          PE:  Constitutional: frail looking  HEENT: NC/AT, EOMI, PERRLA, conjunctivae clear; ears and nose atraumatic; pharynx benign  Neck: supple; thyroid not palpable  Back: no tenderness  Respiratory: decreased breath sounds, crackles   Cardiovascular: S1S2 regular, no murmurs  Abdomen: soft, not tender, not distended, positive BS; liver and spleen WNL  Genitourinary: no suprapubic tenderness  Lymphatic: no LN palpable  Musculoskeletal: no muscle tenderness, no joint swelling or tenderness  Extremities: no pedal edema  Neurological/ Psychiatric: AxOx3, Judgement and insight normal;  moving all extremities  Skin: no rashes; no palpable lesions    Labs: all available labs reviewed                                   13.4   4.92  )-----------( 167      ( 31 Oct 2021 07:05 )             41.0         139  |  107  |  15  ----------------------------<  142<H>  4.3   |  25  |  0.79    Ca    8.5      2021 08:10  Phos  2.9     10-    TPro  6.9  /  Alb  2.8<L>  /  TBili  0.4  /  DBili  0.1  /  AST  59<H>  /  ALT  93<H>  /  AlkPhos  85        Ferritin, Serum: 1544 ng/mL (21 @ 08:10)  C-Reactive Protein, Serum: 85 mg/L (21 @ 08:10)  D-Dimer Assay, Quantitative: <150 ng/mL DDU (21 @ 08:10)  C-Reactive Protein, Serum: 142 mg/L (10-30-21 @ 16:47)  D-Dimer Assay, Quantitative: <150 ng/mL DDU (10-30-21 @ 16:47)  Ferritin, Serum: 1728 ng/mL (10-30-21 @ 16:47)    Alb: 3.0 g/dL / Pro: x     / ALK PHOS: x     / ALT: x     / AST: x     / GGT: x           Urinalysis Basic - ( 30 Oct 2021 16:09 )    Color: Yellow / Appearance: Clear / S.005 / pH: x  Gluc: x / Ketone: Negative  / Bili: Negative / Urobili: Negative mg/dL   Blood: x / Protein: 30 mg/dL / Nitrite: Negative   Leuk Esterase: Negative / RBC: 0-2 /HPF / WBC 0-2   Sq Epi: x / Non Sq Epi: Occasional / Bacteria: Occasional      Radiology: all available radiological tests reviewed      EXAM:  XR CHEST PORTABLE URGENT 1V                            PROCEDURE DATE:  10/30/2021          INTERPRETATION:  History: Dyspnea cough fever COVID    Chest:  one view.    Comparison: No prior    AP radiograph of the chest demonstrates scatteredBILATERAL alveolar infiltrates. The cardiac silhouette is normal in size. Osseous structures are intact.    Impression:scattered BILATERAL alveolar infiltrates    --- End of Report ---      < end of copied text >    Advanced directives addressed: full resuscitation
Cheif complaints and Diagnosis: respiratory failure/ covid 19    Subjective: 94% room air; no complaints      REVIEW OF SYSTEMS:    CONSTITUTIONAL: No weakness, fevers or chills  EYES/ENT: No visual changes;  No vertigo or throat pain   NECK: No pain or stiffness  RESPIRATORY: No cough, wheezing, hemoptysis; No shortness of breath  CARDIOVASCULAR: No chest pain or palpitations  GASTROINTESTINAL: No abdominal or epigastric pain. No nausea, vomiting, or hematemesis; No diarrhea or constipation. No melena or hematochezia.  GENITOURINARY: No dysuria, frequency or hematuria  NEUROLOGICAL: No numbness or weakness  SKIN: No itching, burning, rashes, or lesions   All other review of systems is negative unless indicated above      Vital Signs Last 24 Hrs  T(C): 37.1 (04 Nov 2021 08:15), Max: 37.1 (04 Nov 2021 06:49)  T(F): 98.7 (04 Nov 2021 08:15), Max: 98.7 (04 Nov 2021 06:49)  HR: 77 (04 Nov 2021 08:15) (65 - 77)  BP: 94/54 (04 Nov 2021 08:15) (90/41 - 110/61)  BP(mean): --  RR: 18 (04 Nov 2021 08:15) (18 - 18)  SpO2: 94% (04 Nov 2021 08:15) (94% - 100%)    HEENT:   pupils equal and reactive, EOMI, no oropharyngeal lesions, erythema, exudates, oral thrush    NECK:   supple, no carotid bruits, no palpable lymph nodes, no thyromegaly    CV:  +S1, +S2, regular, no murmurs or rubs    RESP:   lungs clear to auscultation bilaterally, no wheezing, rales, rhonchi, good air entry bilaterally    BREAST:  not examined    GI:  abdomen soft, non-tender, non-distended, normal BS, no bruits, no abdominal masses, no palpable masses    RECTAL:  not examined    :  not examined    MSK:   normal muscle tone, no atrophy, no rigidity, no contractions    EXT:   no clubbing, no cyanosis, no edema, no calf pain, swelling or erythema    VASCULAR:  pulses equal and symmetric in the upper and lower extremities    NEURO:  AAOX3, no focal neurological deficits, follows all commands, able to move extremities spontaneously    SKIN:  no ulcers, lesions or rashes    MEDICATIONS  (STANDING):  dexAMETHasone  Injectable 6 milliGRAM(s) IV Push daily  enoxaparin Injectable 40 milliGRAM(s) SubCutaneous daily  influenza   Vaccine 0.5 milliLiter(s) IntraMuscular once  pantoprazole    Tablet 40 milliGRAM(s) Oral before breakfast    MEDICATIONS  (PRN):  acetaminophen     Tablet .. 650 milliGRAM(s) Oral every 6 hours PRN Temp greater or equal to 38C (100.4F), Mild Pain (1 - 3)  ALBUTerol    90 MICROgram(s) HFA Inhaler 2 Puff(s) Inhalation every 4 hours PRN Shortness of Breath and/or Wheezing  aluminum hydroxide/magnesium hydroxide/simethicone Suspension 30 milliLiter(s) Oral every 4 hours PRN Dyspepsia  guaifenesin/dextromethorphan Oral Liquid 10 milliLiter(s) Oral every 4 hours PRN Cough  melatonin 3 milliGRAM(s) Oral at bedtime PRN Insomnia  ondansetron Injectable 4 milliGRAM(s) IV Push every 8 hours PRN Nausea and/or Vomiting      03 Nov 2021 09:10    x      |  x      |  x      ----------------------------<  x      x       |  x      |  0.87       TPro  6.8    /  Alb  3.0    /  TBili  0.6    /  DBili  0.2    /  AST  70     /  ALT  133    /  AlkPhos  85     03 Nov 2021 09:10  LIVER FUNCTIONS - ( 03 Nov 2021 09:10 )  Alb: 3.0 g/dL / Pro: 6.8 gm/dL / ALK PHOS: 85 U/L / ALT: 133 U/L / AST: 70 U/L / GGT: x         PT/INR - ( 03 Nov 2021 09:10 )   PT: 14.7 sec;   INR: 1.28 ratio          PT/INR - ( 03 Nov 2021 09:10 )   PT: 14.7 sec;   INR: 1.28 ratio             Hospital Course:    50 yo female with no significant PMHx admitted with COVID PNA.      #Acute Hypoxic Respiratory failure 2/2 COVID19:    Cont present care.    Remdesivir/ decadron day #5/5.    Pulse ox changed to forehead (she has acrylic nails) and titrated down to NC 2 L >> now titrated down to room air.   Unvaccinated.  Antibodies negative.    patient stable , will return home 
  Date of service: 11-03-21 @ 13:11    pt seen and examined  on NC, feels better  afebrile  has cough, sob    ROS: no fever or chills; denies dizziness, no HA, no abdominal pain, no diarrhea or constipation; no dysuria, no urinary frequency, no legs pain, no rashes      MEDICATIONS  (STANDING):  dexAMETHasone  Injectable 6 milliGRAM(s) IV Push daily  enoxaparin Injectable 40 milliGRAM(s) SubCutaneous daily  influenza   Vaccine 0.5 milliLiter(s) IntraMuscular once  pantoprazole    Tablet 40 milliGRAM(s) Oral before breakfast  remdesivir  IVPB 100 milliGRAM(s) IV Intermittent every 24 hours  remdesivir  IVPB   IV Intermittent       Vital Signs Last 24 Hrs  T(C): 36.7 (03 Nov 2021 08:16), Max: 36.7 (02 Nov 2021 19:30)  T(F): 98 (03 Nov 2021 08:16), Max: 98 (02 Nov 2021 19:30)  HR: 65 (03 Nov 2021 00:10) (65 - 66)  BP: 99/61 (03 Nov 2021 08:16) (99/61 - 121/55)  BP(mean): --  RR: 18 (03 Nov 2021 08:16) (18 - 18)  SpO2: 100% (03 Nov 2021 08:16) (95% - 100%)      PE:  Constitutional: frail looking  HEENT: NC/AT, EOMI, PERRLA, conjunctivae clear; ears and nose atraumatic; pharynx benign  Neck: supple; thyroid not palpable  Back: no tenderness  Respiratory: decreased breath sounds, crackles   Cardiovascular: S1S2 regular, no murmurs  Abdomen: soft, not tender, not distended, positive BS; liver and spleen WNL  Genitourinary: no suprapubic tenderness  Lymphatic: no LN palpable  Musculoskeletal: no muscle tenderness, no joint swelling or tenderness  Extremities: no pedal edema  Neurological/ Psychiatric: AxOx3, Judgement and insight normal;  moving all extremities  Skin: no rashes; no palpable lesions    Labs: all available labs reviewed                 11-03    x   |  x   |  x   ----------------------------<  x   x    |  x   |  0.87      TPro  6.8  /  Alb  3.0<L>  /  TBili  0.6  /  DBili  0.2  /  AST  70<H>  /  ALT  133<H>  /  AlkPhos  85  11-03      Ferritin, Serum: 1544 ng/mL (11-01-21 @ 08:10)  C-Reactive Protein, Serum: 85 mg/L (11-01-21 @ 08:10)  D-Dimer Assay, Quantitative: <150 ng/mL DDU (11-01-21 @ 08:10)  C-Reactive Protein, Serum: 142 mg/L (10-30-21 @ 16:47)  D-Dimer Assay, Quantitative: <150 ng/mL DDU (10-30-21 @ 16:47)  Ferritin, Serum: 1728 ng/mL (10-30-21 @ 16:47)      Radiology: all available radiological tests reviewed      EXAM:  XR CHEST PORTABLE URGENT 1V                            PROCEDURE DATE:  10/30/2021          INTERPRETATION:  History: Dyspnea cough fever COVID    Chest:  one view.    Comparison: No prior    AP radiograph of the chest demonstrates scatteredBILATERAL alveolar infiltrates. The cardiac silhouette is normal in size. Osseous structures are intact.    Impression:scattered BILATERAL alveolar infiltrates    --- End of Report ---      < end of copied text >    Advanced directives addressed: full resuscitation
Date of service: 11-04-21 @ 11:24    pt seen and examined  o2 sats on RA > 90s, feels better  afebrile    ROS: no fever or chills; denies dizziness, no HA, no abdominal pain, no diarrhea or constipation; no dysuria, no urinary frequency, no legs pain, no rashes      MEDICATIONS  (STANDING):  dexAMETHasone  Injectable 6 milliGRAM(s) IV Push daily  enoxaparin Injectable 40 milliGRAM(s) SubCutaneous daily  influenza   Vaccine 0.5 milliLiter(s) IntraMuscular once  pantoprazole    Tablet 40 milliGRAM(s) Oral before breakfast    Vital Signs Last 24 Hrs  T(C): 37.1 (04 Nov 2021 08:15), Max: 37.1 (04 Nov 2021 06:49)  T(F): 98.7 (04 Nov 2021 08:15), Max: 98.7 (04 Nov 2021 06:49)  HR: 77 (04 Nov 2021 08:15) (65 - 77)  BP: 94/54 (04 Nov 2021 08:15) (90/41 - 110/61)  BP(mean): --  RR: 18 (04 Nov 2021 08:15) (18 - 18)  SpO2: 94% (04 Nov 2021 08:15) (94% - 100%)      PE:  Constitutional: frail looking  HEENT: NC/AT, EOMI, PERRLA, conjunctivae clear; ears and nose atraumatic; pharynx benign  Neck: supple; thyroid not palpable  Back: no tenderness  Respiratory: decreased breath sounds, crackles   Cardiovascular: S1S2 regular, no murmurs  Abdomen: soft, not tender, not distended, positive BS; liver and spleen WNL  Genitourinary: no suprapubic tenderness  Lymphatic: no LN palpable  Musculoskeletal: no muscle tenderness, no joint swelling or tenderness  Extremities: no pedal edema  Neurological/ Psychiatric: AxOx3, Judgement and insight normal;  moving all extremities  Skin: no rashes; no palpable lesions    Labs: all available labs reviewed                 11-04    x   |  x   |  x   ----------------------------<  x   x    |  x   |  0.84      TPro  7.1  /  Alb  3.1<L>  /  TBili  0.6  /  DBili  0.2  /  AST  68<H>  /  ALT  135<H>  /  AlkPhos  88  11-04        Ferritin, Serum: 1544 ng/mL (11-01-21 @ 08:10)  C-Reactive Protein, Serum: 85 mg/L (11-01-21 @ 08:10)  D-Dimer Assay, Quantitative: <150 ng/mL DDU (11-01-21 @ 08:10)  C-Reactive Protein, Serum: 142 mg/L (10-30-21 @ 16:47)  D-Dimer Assay, Quantitative: <150 ng/mL DDU (10-30-21 @ 16:47)  Ferritin, Serum: 1728 ng/mL (10-30-21 @ 16:47)      Radiology: all available radiological tests reviewed      EXAM:  XR CHEST PORTABLE URGENT 1V                            PROCEDURE DATE:  10/30/2021          INTERPRETATION:  History: Dyspnea cough fever COVID    Chest:  one view.    Comparison: No prior    AP radiograph of the chest demonstrates scatteredBILATERAL alveolar infiltrates. The cardiac silhouette is normal in size. Osseous structures are intact.    Impression: scattered BILATERAL alveolar infiltrates    --- End of Report ---      < end of copied text >    Advanced directives addressed: full resuscitation
Date of service: 21 @ 11:41    pt seen and examined  on VM, o2 sats > 90s  afebrile  has cough, sob    ROS: no fever or chills; denies dizziness, no HA, no abdominal pain, no diarrhea or constipation; no dysuria, no urinary frequency, no legs pain, no rashes      MEDICATIONS  (STANDING):  dexAMETHasone  Injectable 6 milliGRAM(s) IV Push daily  enoxaparin Injectable 40 milliGRAM(s) SubCutaneous daily  influenza   Vaccine 0.5 milliLiter(s) IntraMuscular once  pantoprazole    Tablet 40 milliGRAM(s) Oral before breakfast  remdesivir  IVPB 100 milliGRAM(s) IV Intermittent every 24 hours  remdesivir  IVPB   IV Intermittent       Vital Signs Last 24 Hrs  T(C): 36.6 (2021 08:07), Max: 36.7 (2021 20:28)  T(F): 97.8 (2021 08:07), Max: 98.1 (2021 20:28)  HR: 60 (2021 08:07) (60 - 72)  BP: 101/55 (2021 08:07) (101/55 - 108/65)  BP(mean): --  RR: 18 (2021 08:07) (18 - 19)  SpO2: 94% (2021 08:07) (94% - 97%)      PE:  Constitutional: frail looking  HEENT: NC/AT, EOMI, PERRLA, conjunctivae clear; ears and nose atraumatic; pharynx benign  Neck: supple; thyroid not palpable  Back: no tenderness  Respiratory: decreased breath sounds, crackles   Cardiovascular: S1S2 regular, no murmurs  Abdomen: soft, not tender, not distended, positive BS; liver and spleen WNL  Genitourinary: no suprapubic tenderness  Lymphatic: no LN palpable  Musculoskeletal: no muscle tenderness, no joint swelling or tenderness  Extremities: no pedal edema  Neurological/ Psychiatric: AxOx3, Judgement and insight normal;  moving all extremities  Skin: no rashes; no palpable lesions    Labs: all available labs reviewed                   x   |  x   |  x   ----------------------------<  x   x    |  x   |  0.89    Ca    8.5      2021 08:10    TPro  6.8  /  Alb  2.9<L>  /  TBili  0.4  /  DBili  0.1  /  AST  83<H>  /  ALT  129<H>  /  AlkPhos  84  11-02      Alb: 3.0 g/dL / Pro: x     / ALK PHOS: x     / ALT: x     / AST: x     / GGT: x           Urinalysis Basic - ( 30 Oct 2021 16:09 )    Color: Yellow / Appearance: Clear / S.005 / pH: x  Gluc: x / Ketone: Negative  / Bili: Negative / Urobili: Negative mg/dL   Blood: x / Protein: 30 mg/dL / Nitrite: Negative   Leuk Esterase: Negative / RBC: 0-2 /HPF / WBC 0-2   Sq Epi: x / Non Sq Epi: Occasional / Bacteria: Occasional      Radiology: all available radiological tests reviewed      EXAM:  XR CHEST PORTABLE URGENT 1V                            PROCEDURE DATE:  10/30/2021          INTERPRETATION:  History: Dyspnea cough fever COVID    Chest:  one view.    Comparison: No prior    AP radiograph of the chest demonstrates scatteredBILATERAL alveolar infiltrates. The cardiac silhouette is normal in size. Osseous structures are intact.    Impression:scattered BILATERAL alveolar infiltrates    --- End of Report ---      < end of copied text >    Advanced directives addressed: full resuscitation
Reason for Admission: COVID 19 infection    50 y/o female with no significant PMHx. who was referred to the ER from Urgent care because of hypoxia SOB, She ws diagnosed with  COVID 19 , 10 days ago. She became increasingly SOB in the past 3 days.     10/31 - breathing better today.  requiring 50% VM +cough.    11/1:  Pt seen.  About the same.  Remains on 50% on venti.   [place]  #[id number]    ROS:   All 10 systems reviewed and found to be negative with the exception of what has been described above.    Vital Signs Last 24 Hrs  T(C): 37.1 (31 Oct 2021 21:02), Max: 37.1 (31 Oct 2021 21:02)  T(F): 98.7 (31 Oct 2021 21:02), Max: 98.7 (31 Oct 2021 21:02)  HR: 77 (31 Oct 2021 21:02) (77 - 77)  BP: 109/63 (31 Oct 2021 21:02) (109/63 - 109/63)  SpO2: 95% (31 Oct 2021 21:02) (95% - 95%)    GEN: lying in bed, mild distress/ breathing  HEENT:   NC/AT  CV:  +S1, +S2, RRR  RESP:   lungs clear to auscultation bilaterally, no wheeze, rales, rhonchi   BREAST:  not examined  GI:  abdomen soft, non-tender, non-distended, normoactive BS  EXT:  no edema  NEURO:  AAOX3, no focal neurological deficits  SKIN:  no rashes             11-01    139  |  107  |  15  ----------------------------<  142<H>  4.3   |  25  |  0.79    Ca    8.5      01 Nov 2021 08:10  Phos  2.9     10-31    TPro  6.9  /  Alb  2.8<L>  /  TBili  0.4  /  DBili  0.1  /  AST  59<H>  /  ALT  93<H>  /  AlkPhos  85  11-01                        13.4   4.92  )-----------( 167      ( 31 Oct 2021 07:05 )             41.0     LIVER FUNCTIONS - ( 01 Nov 2021 08:10 )  Alb: 2.8 g/dL / Pro: 6.9 gm/dL / ALK PHOS: 85 U/L / ALT: 93 U/L / AST: 59 U/L / GGT: x             PT/INR - ( 01 Nov 2021 08:10 )   PT: 13.3 sec;   INR: 1.15 ratio      < from: Xray Chest 1 View- PORTABLE-Urgent (10.30.21 @ 16:19) >  AP radiograph of the chest demonstrates scatteredBILATERAL alveolar infiltrates. The cardiac silhouette is normal in size. Osseous structures are intact.    Impression:scattered BILATERAL alveolar infiltrates    < end of copied text >      
Reason for Admission: COVID 19 infection    52 y/o female with no significant PMHx. who was referred to the ER from Urgent care because of hypoxia SOB, She ws diagnosed with  COVID 19 , 10 days ago. She became increasingly SOB in the past 3 days.     10/31 - breathing better today.  requiring 50% VM +cough.    11/1:  Pt seen.  About the same.  Remains on 50% on venti.   [place]  #[id number]  11/2:  Pt seen.  Feeling improved.  Used aide at bedside for translation today.      ROS:   All 10 systems reviewed and found to be negative with the exception of what has been described above.    Vital Signs Last 24 Hrs  T(C): 36.6 (02 Nov 2021 08:07), Max: 36.7 (01 Nov 2021 20:28)  T(F): 97.8 (02 Nov 2021 08:07), Max: 98.1 (01 Nov 2021 20:28)  HR: 60 (02 Nov 2021 08:07) (60 - 72)  BP: 101/55 (02 Nov 2021 08:07) (101/55 - 108/65)  BP(mean): --  RR: 18 (02 Nov 2021 08:07) (18 - 19)  SpO2: 99% (02 Nov 2021 15:00) (94% - 99%)    GEN: lying in bed, mild distress/ breathing  HEENT:   NC/AT  CV:  +S1, +S2, RRR  RESP:   lungs clear to auscultation bilaterally, no wheeze, rales, rhonchi   BREAST:  not examined  GI:  abdomen soft, non-tender, non-distended, normoactive BS  EXT:  no edema  NEURO:  AAOX3, no focal neurological deficits  SKIN:  no rashes             11-02    x   |  x   |  x   ----------------------------<  x   x    |  x   |  0.89    Ca    8.5      01 Nov 2021 08:10    TPro  6.8  /  Alb  2.9<L>  /  TBili  0.4  /  DBili  0.1  /  AST  83<H>  /  ALT  129<H>  /  AlkPhos  84  11-02    LIVER FUNCTIONS - ( 02 Nov 2021 06:51 )  Alb: 2.9 g/dL / Pro: 6.8 gm/dL / ALK PHOS: 84 U/L / ALT: 129 U/L / AST: 83 U/L / GGT: x           PT/INR - ( 02 Nov 2021 06:51 )   PT: 14.1 sec;   INR: 1.22 ratio          PT/INR - ( 01 Nov 2021 08:10 )   PT: 13.3 sec;   INR: 1.15 ratio      < from: Xray Chest 1 View- PORTABLE-Urgent (10.30.21 @ 16:19) >  AP radiograph of the chest demonstrates scatteredBILATERAL alveolar infiltrates. The cardiac silhouette is normal in size. Osseous structures are intact.    Impression:scattered BILATERAL alveolar infiltrates    < end of copied text >

## 2021-11-04 NOTE — PROGRESS NOTE ADULT - ASSESSMENT
50 yo female with no significant PMHx. who was referred to the ER from Urgent care because of hypoxia SOB, She ws diagnosed with  COVID 19 , 10 days ago. She became increasingly SOB in the past 3 days. Here bilateral infiltrates on xray, positive for covid-19 was given remdesivir/decadron.     1. acute respiratory failure. covid 19 viral syndrome. multifocal pneumonia.   - on remdesivir/decadron #4  - continue with steroids/antivirals  - monitor renal/hepatic function closely  - observe off antibiotics   - monitor temps  - isolation precautions  - supportive care  - fu cbc    2. other issues - care per medicine
50 yo female with no significant PMHx. who was referred to the ER from Urgent care because of hypoxia SOB, She ws diagnosed with  COVID 19 , 10 days ago. She became increasingly SOB in the past 3 days. Here bilateral infiltrates on xray, positive for covid-19 was given remdesivir/decadron.     1. acute respiratory failure. covid 19 viral syndrome. multifocal pneumonia.   - on remdesivir/decadron #5  - continue with steroids/antivirals  - monitor renal/hepatic function closely  - observe off antibiotics   - monitor temps  - isolation precautions  - supportive care  - fu cbc    2. other issues - care per medicine
52 yo female with no significant PMHx admitted with COVID PNA.      #Acute Hypoxic Respiratory failure 2/2 COVID19:    Cont present care.    Remdesivir/ decadron day #3.    Cont venti mask.    Monitor pulse ox q 6 hours.    Prone PNR.    ID f/u.    Trend inflammatory markers.    Unvaccinated.  Antibodies negative.      #Hyperglycemia:    A1C 6.1-- c/w prediabetes.    DM diet.    Sliding scale while on steroids.      #DVT Proph:  Lovenox.            
52 yo female with no significant PMHx admitted with COVID PNA.      #Acute Hypoxic Respiratory failure 2/2 COVID19:    Cont present care.    Remdesivir/ decadron day #4/5.    Pulse ox changed to forehead (she has acrylic nails) and titrated down to NC 2 L.    Continuous pulse ox for now while titrating.    ID f/u.    Trend inflammatory markers.    Unvaccinated.  Antibodies negative.      #Hyperglycemia:    A1C 6.1-- c/w prediabetes.    DM diet.    Sliding scale while on steroids.      #DVT Proph:  Lovenox.        D/c planning for 11/3.  Will need O2 determination prior to d/c.      
52 yo female with no significant PMHx. who was referred to the ER from Urgent care because of hypoxia SOB, She ws diagnosed with  COVID 19 , 10 days ago. She became increasingly SOB in the past 3 days. Here bilateral infiltrates on xray, positive for covid-19 was given remdesivir/decadron.     1. acute respiratory failure. covid 19 viral syndrome. multifocal pneumonia.   - completed remdesivir #5 --> 11/3  - on decadron #6  - monitor renal/hepatic function closely  - observe off antibiotics   - monitor temps  - isolation precautions  - supportive care  - fu cbc    2. other issues - care per medicine
50 yo female with no significant PMHx. who was referred to the ER from Urgent care because of hypoxia SOB, She ws diagnosed with  COVID 19 , 10 days ago. She became increasingly SOB in the past 3 days. Here bilateral infiltrates on xray, positive for covid-19 was given remdesivir/decadron.     1. acute respiratory failure. covid 19 viral syndrome. multifocal pneumonia.   - on remdesivir/decadron #3  - continue with steroids/antivirals  - monitor renal/hepatic function closely  - observe off antibiotics   - monitor temps  - isolation precautions  - supportive care  - fu cbc    2. other issues - care per medicine
Family

## 2021-11-04 NOTE — PROGRESS NOTE ADULT - REASON FOR ADMISSION
COVID 19 infection.

## 2021-11-09 DIAGNOSIS — J96.91 RESPIRATORY FAILURE, UNSPECIFIED WITH HYPOXIA: ICD-10-CM

## 2021-11-09 DIAGNOSIS — U07.1 COVID-19: ICD-10-CM

## 2021-11-09 DIAGNOSIS — J12.82 PNEUMONIA DUE TO CORONAVIRUS DISEASE 2019: ICD-10-CM

## 2021-11-09 DIAGNOSIS — R73.03 PREDIABETES: ICD-10-CM

## 2021-11-17 ENCOUNTER — TRANSCRIPTION ENCOUNTER (OUTPATIENT)
Age: 51
End: 2021-11-17

## 2021-12-24 ENCOUNTER — OUTPATIENT (OUTPATIENT)
Dept: OUTPATIENT SERVICES | Facility: HOSPITAL | Age: 51
LOS: 1 days | End: 2021-12-24
Payer: MEDICARE

## 2021-12-24 DIAGNOSIS — Z20.828 CONTACT WITH AND (SUSPECTED) EXPOSURE TO OTHER VIRAL COMMUNICABLE DISEASES: ICD-10-CM

## 2021-12-24 PROBLEM — Z78.9 OTHER SPECIFIED HEALTH STATUS: Chronic | Status: ACTIVE | Noted: 2021-10-30

## 2021-12-24 LAB — SARS-COV-2 RNA SPEC QL NAA+PROBE: DETECTED

## 2021-12-24 PROCEDURE — C9803: CPT

## 2021-12-24 PROCEDURE — U0005: CPT

## 2021-12-24 PROCEDURE — U0003: CPT

## 2021-12-25 DIAGNOSIS — Z20.828 CONTACT WITH AND (SUSPECTED) EXPOSURE TO OTHER VIRAL COMMUNICABLE DISEASES: ICD-10-CM

## 2022-01-14 ENCOUNTER — OUTPATIENT (OUTPATIENT)
Dept: OUTPATIENT SERVICES | Facility: HOSPITAL | Age: 52
LOS: 1 days | End: 2022-01-14
Payer: MEDICARE

## 2022-01-14 DIAGNOSIS — Z20.828 CONTACT WITH AND (SUSPECTED) EXPOSURE TO OTHER VIRAL COMMUNICABLE DISEASES: ICD-10-CM

## 2022-01-14 LAB — SARS-COV-2 RNA SPEC QL NAA+PROBE: SIGNIFICANT CHANGE UP

## 2022-01-14 PROCEDURE — C9803: CPT

## 2022-01-14 PROCEDURE — U0005: CPT

## 2022-01-14 PROCEDURE — U0003: CPT

## 2022-01-15 DIAGNOSIS — Z20.828 CONTACT WITH AND (SUSPECTED) EXPOSURE TO OTHER VIRAL COMMUNICABLE DISEASES: ICD-10-CM

## 2022-03-29 ENCOUNTER — OUTPATIENT (OUTPATIENT)
Dept: OUTPATIENT SERVICES | Facility: HOSPITAL | Age: 52
LOS: 1 days | End: 2022-03-29
Payer: COMMERCIAL

## 2022-03-29 ENCOUNTER — APPOINTMENT (OUTPATIENT)
Dept: MAMMOGRAPHY | Facility: CLINIC | Age: 52
End: 2022-03-29
Payer: COMMERCIAL

## 2022-03-29 DIAGNOSIS — Z01.419 ENCOUNTER FOR GYNECOLOGICAL EXAMINATION (GENERAL) (ROUTINE) WITHOUT ABNORMAL FINDINGS: ICD-10-CM

## 2022-03-29 PROCEDURE — 77063 BREAST TOMOSYNTHESIS BI: CPT | Mod: 26

## 2022-03-29 PROCEDURE — 77067 SCR MAMMO BI INCL CAD: CPT

## 2022-03-29 PROCEDURE — 77063 BREAST TOMOSYNTHESIS BI: CPT

## 2022-03-29 PROCEDURE — 77067 SCR MAMMO BI INCL CAD: CPT | Mod: 26

## 2022-07-21 ENCOUNTER — APPOINTMENT (OUTPATIENT)
Dept: ULTRASOUND IMAGING | Facility: CLINIC | Age: 52
End: 2022-07-21
Payer: MEDICAID

## 2022-07-21 ENCOUNTER — OUTPATIENT (OUTPATIENT)
Dept: OUTPATIENT SERVICES | Facility: HOSPITAL | Age: 52
LOS: 1 days | End: 2022-07-21
Payer: MEDICAID

## 2022-07-21 DIAGNOSIS — R10.2 PELVIC AND PERINEAL PAIN: ICD-10-CM

## 2022-07-21 PROCEDURE — 76830 TRANSVAGINAL US NON-OB: CPT | Mod: 26

## 2022-07-21 PROCEDURE — 76830 TRANSVAGINAL US NON-OB: CPT

## 2022-07-21 PROCEDURE — 76856 US EXAM PELVIC COMPLETE: CPT

## 2022-07-21 PROCEDURE — 76856 US EXAM PELVIC COMPLETE: CPT | Mod: 26,59

## 2023-03-28 ENCOUNTER — OUTPATIENT (OUTPATIENT)
Dept: OUTPATIENT SERVICES | Facility: HOSPITAL | Age: 53
LOS: 1 days | End: 2023-03-28
Payer: MEDICAID

## 2023-03-28 ENCOUNTER — APPOINTMENT (OUTPATIENT)
Dept: MAMMOGRAPHY | Facility: CLINIC | Age: 53
End: 2023-03-28
Payer: MEDICAID

## 2023-03-28 DIAGNOSIS — Z12.39 ENCOUNTER FOR OTHER SCREENING FOR MALIGNANT NEOPLASM OF BREAST: ICD-10-CM

## 2023-03-28 PROCEDURE — 77067 SCR MAMMO BI INCL CAD: CPT | Mod: 26

## 2023-03-28 PROCEDURE — 77067 SCR MAMMO BI INCL CAD: CPT

## 2023-03-28 PROCEDURE — 77063 BREAST TOMOSYNTHESIS BI: CPT | Mod: 26

## 2023-03-28 PROCEDURE — 77063 BREAST TOMOSYNTHESIS BI: CPT

## 2023-11-07 ENCOUNTER — NON-APPOINTMENT (OUTPATIENT)
Age: 53
End: 2023-11-07

## 2023-11-13 ENCOUNTER — APPOINTMENT (OUTPATIENT)
Dept: ORTHOPEDIC SURGERY | Facility: CLINIC | Age: 53
End: 2023-11-13
Payer: MEDICAID

## 2023-11-13 PROCEDURE — 99203 OFFICE O/P NEW LOW 30 MIN: CPT

## 2023-12-01 ENCOUNTER — NON-APPOINTMENT (OUTPATIENT)
Age: 53
End: 2023-12-01

## 2023-12-04 PROBLEM — G56.02 CARPAL TUNNEL SYNDROME OF LEFT WRIST: Status: ACTIVE | Noted: 2023-11-13

## 2023-12-11 ENCOUNTER — APPOINTMENT (OUTPATIENT)
Dept: ORTHOPEDIC SURGERY | Facility: CLINIC | Age: 53
End: 2023-12-11
Payer: MEDICAID

## 2023-12-11 DIAGNOSIS — G56.02 CARPAL TUNNEL SYNDROME, LEFT UPPER LIMB: ICD-10-CM

## 2023-12-11 PROCEDURE — 99214 OFFICE O/P EST MOD 30 MIN: CPT

## 2023-12-12 VITALS — BODY MASS INDEX: 30.45 KG/M2 | WEIGHT: 194 LBS | HEIGHT: 67 IN

## 2024-01-10 ENCOUNTER — OUTPATIENT (OUTPATIENT)
Dept: OUTPATIENT SERVICES | Facility: HOSPITAL | Age: 54
LOS: 1 days | End: 2024-01-10
Payer: COMMERCIAL

## 2024-01-10 VITALS
HEIGHT: 65 IN | OXYGEN SATURATION: 98 % | RESPIRATION RATE: 16 BRPM | DIASTOLIC BLOOD PRESSURE: 81 MMHG | HEART RATE: 98 BPM | WEIGHT: 173.06 LBS | SYSTOLIC BLOOD PRESSURE: 121 MMHG | TEMPERATURE: 98 F

## 2024-01-10 DIAGNOSIS — Z01.818 ENCOUNTER FOR OTHER PREPROCEDURAL EXAMINATION: ICD-10-CM

## 2024-01-10 DIAGNOSIS — G56.21 LESION OF ULNAR NERVE, RIGHT UPPER LIMB: ICD-10-CM

## 2024-01-10 DIAGNOSIS — Z98.890 OTHER SPECIFIED POSTPROCEDURAL STATES: Chronic | ICD-10-CM

## 2024-01-10 DIAGNOSIS — G56.01 CARPAL TUNNEL SYNDROME, RIGHT UPPER LIMB: ICD-10-CM

## 2024-01-10 LAB
ANION GAP SERPL CALC-SCNC: 9 MMOL/L — SIGNIFICANT CHANGE UP (ref 5–17)
ANION GAP SERPL CALC-SCNC: 9 MMOL/L — SIGNIFICANT CHANGE UP (ref 5–17)
BUN SERPL-MCNC: 12 MG/DL — SIGNIFICANT CHANGE UP (ref 7–23)
BUN SERPL-MCNC: 12 MG/DL — SIGNIFICANT CHANGE UP (ref 7–23)
CALCIUM SERPL-MCNC: 9.9 MG/DL — SIGNIFICANT CHANGE UP (ref 8.4–10.5)
CALCIUM SERPL-MCNC: 9.9 MG/DL — SIGNIFICANT CHANGE UP (ref 8.4–10.5)
CHLORIDE SERPL-SCNC: 102 MMOL/L — SIGNIFICANT CHANGE UP (ref 96–108)
CHLORIDE SERPL-SCNC: 102 MMOL/L — SIGNIFICANT CHANGE UP (ref 96–108)
CO2 SERPL-SCNC: 29 MMOL/L — SIGNIFICANT CHANGE UP (ref 22–31)
CO2 SERPL-SCNC: 29 MMOL/L — SIGNIFICANT CHANGE UP (ref 22–31)
CREAT SERPL-MCNC: 1.04 MG/DL — SIGNIFICANT CHANGE UP (ref 0.5–1.3)
CREAT SERPL-MCNC: 1.04 MG/DL — SIGNIFICANT CHANGE UP (ref 0.5–1.3)
EGFR: 64 ML/MIN/1.73M2 — SIGNIFICANT CHANGE UP
EGFR: 64 ML/MIN/1.73M2 — SIGNIFICANT CHANGE UP
GLUCOSE SERPL-MCNC: 129 MG/DL — HIGH (ref 70–99)
GLUCOSE SERPL-MCNC: 129 MG/DL — HIGH (ref 70–99)
HCT VFR BLD CALC: 44.6 % — SIGNIFICANT CHANGE UP (ref 34.5–45)
HCT VFR BLD CALC: 44.6 % — SIGNIFICANT CHANGE UP (ref 34.5–45)
HGB BLD-MCNC: 14.4 G/DL — SIGNIFICANT CHANGE UP (ref 11.5–15.5)
HGB BLD-MCNC: 14.4 G/DL — SIGNIFICANT CHANGE UP (ref 11.5–15.5)
MCHC RBC-ENTMCNC: 26 PG — LOW (ref 27–34)
MCHC RBC-ENTMCNC: 26 PG — LOW (ref 27–34)
MCHC RBC-ENTMCNC: 32.3 GM/DL — SIGNIFICANT CHANGE UP (ref 32–36)
MCHC RBC-ENTMCNC: 32.3 GM/DL — SIGNIFICANT CHANGE UP (ref 32–36)
MCV RBC AUTO: 80.7 FL — SIGNIFICANT CHANGE UP (ref 80–100)
MCV RBC AUTO: 80.7 FL — SIGNIFICANT CHANGE UP (ref 80–100)
NRBC # BLD: 0 /100 WBCS — SIGNIFICANT CHANGE UP (ref 0–0)
NRBC # BLD: 0 /100 WBCS — SIGNIFICANT CHANGE UP (ref 0–0)
PLATELET # BLD AUTO: 227 K/UL — SIGNIFICANT CHANGE UP (ref 150–400)
PLATELET # BLD AUTO: 227 K/UL — SIGNIFICANT CHANGE UP (ref 150–400)
POTASSIUM SERPL-MCNC: 3.7 MMOL/L — SIGNIFICANT CHANGE UP (ref 3.5–5.3)
POTASSIUM SERPL-MCNC: 3.7 MMOL/L — SIGNIFICANT CHANGE UP (ref 3.5–5.3)
POTASSIUM SERPL-SCNC: 3.7 MMOL/L — SIGNIFICANT CHANGE UP (ref 3.5–5.3)
POTASSIUM SERPL-SCNC: 3.7 MMOL/L — SIGNIFICANT CHANGE UP (ref 3.5–5.3)
RBC # BLD: 5.53 M/UL — HIGH (ref 3.8–5.2)
RBC # BLD: 5.53 M/UL — HIGH (ref 3.8–5.2)
RBC # FLD: 12.7 % — SIGNIFICANT CHANGE UP (ref 10.3–14.5)
RBC # FLD: 12.7 % — SIGNIFICANT CHANGE UP (ref 10.3–14.5)
SODIUM SERPL-SCNC: 140 MMOL/L — SIGNIFICANT CHANGE UP (ref 135–145)
SODIUM SERPL-SCNC: 140 MMOL/L — SIGNIFICANT CHANGE UP (ref 135–145)
WBC # BLD: 7.61 K/UL — SIGNIFICANT CHANGE UP (ref 3.8–10.5)
WBC # BLD: 7.61 K/UL — SIGNIFICANT CHANGE UP (ref 3.8–10.5)
WBC # FLD AUTO: 7.61 K/UL — SIGNIFICANT CHANGE UP (ref 3.8–10.5)
WBC # FLD AUTO: 7.61 K/UL — SIGNIFICANT CHANGE UP (ref 3.8–10.5)

## 2024-01-10 PROCEDURE — G0463: CPT

## 2024-01-10 PROCEDURE — 85027 COMPLETE CBC AUTOMATED: CPT

## 2024-01-10 PROCEDURE — 93005 ELECTROCARDIOGRAM TRACING: CPT

## 2024-01-10 PROCEDURE — 93010 ELECTROCARDIOGRAM REPORT: CPT

## 2024-01-10 PROCEDURE — 80048 BASIC METABOLIC PNL TOTAL CA: CPT

## 2024-01-10 PROCEDURE — 36415 COLL VENOUS BLD VENIPUNCTURE: CPT

## 2024-01-10 PROCEDURE — T1013: CPT

## 2024-01-10 RX ORDER — ASPIRIN/CALCIUM CARB/MAGNESIUM 324 MG
1 TABLET ORAL
Qty: 0 | Refills: 0 | DISCHARGE

## 2024-01-10 RX ORDER — ACETAMINOPHEN, DEXTROMETHORPHAN HBR, PHENYLEPHRINE HCL 500; 20; 10 MG/1; MG/1; MG/1
2 POWDER, FOR SOLUTION ORAL
Qty: 0 | Refills: 0 | DISCHARGE

## 2024-01-10 NOTE — H&P PST ADULT - ASSESSMENT
54 y/o female with right carpal tunnel syndrome  Planned surgery.- right carpal tunnel  release endoscopic possible open  Will obtain medical clearance  Pre op instructions provided  Instructions provided on medications to continue and to take the day morning of surgery

## 2024-01-10 NOTE — H&P PST ADULT - HISTORY OF PRESENT ILLNESS
54 y/o female with right wrist pain for 2 months. Pain progressively getting worst and was advised surgery 52 y/o female with right wrist pain for 2 months. Pain progressively getting worst and was advised surgery
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
## 2024-01-10 NOTE — H&P PST ADULT - NSICDXPROCEDURE_GEN_ALL_CORE_FT
PROCEDURES:  Open release of right carpal tunnel 10-Stevie-2024 13:51:32 endoscopic possible open Peg Chang

## 2024-01-11 NOTE — ED ADULT TRIAGE NOTE - PRO INTERPRETER NEED 2
Notified patient to  antibiotic from her pharmacy.  We will send urine off for culture and notify patient of results.   English

## 2024-01-15 ENCOUNTER — NON-APPOINTMENT (OUTPATIENT)
Age: 54
End: 2024-01-15

## 2024-01-16 RX ORDER — CEFAZOLIN SODIUM 1 G
2000 VIAL (EA) INJECTION ONCE
Refills: 0 | Status: DISCONTINUED | OUTPATIENT
Start: 2024-01-23 | End: 2024-02-06

## 2024-01-22 ENCOUNTER — TRANSCRIPTION ENCOUNTER (OUTPATIENT)
Age: 54
End: 2024-01-22

## 2024-01-23 ENCOUNTER — TRANSCRIPTION ENCOUNTER (OUTPATIENT)
Age: 54
End: 2024-01-23

## 2024-01-23 ENCOUNTER — OUTPATIENT (OUTPATIENT)
Dept: OUTPATIENT SERVICES | Facility: HOSPITAL | Age: 54
LOS: 1 days | End: 2024-01-23
Payer: COMMERCIAL

## 2024-01-23 ENCOUNTER — APPOINTMENT (OUTPATIENT)
Dept: ORTHOPEDIC SURGERY | Facility: HOSPITAL | Age: 54
End: 2024-01-23

## 2024-01-23 VITALS
OXYGEN SATURATION: 97 % | WEIGHT: 186.29 LBS | SYSTOLIC BLOOD PRESSURE: 175 MMHG | TEMPERATURE: 98 F | RESPIRATION RATE: 16 BRPM | HEART RATE: 94 BPM | DIASTOLIC BLOOD PRESSURE: 96 MMHG | HEIGHT: 67 IN

## 2024-01-23 VITALS — SYSTOLIC BLOOD PRESSURE: 112 MMHG | HEART RATE: 77 BPM | RESPIRATION RATE: 14 BRPM | DIASTOLIC BLOOD PRESSURE: 76 MMHG

## 2024-01-23 DIAGNOSIS — Z01.818 ENCOUNTER FOR OTHER PREPROCEDURAL EXAMINATION: ICD-10-CM

## 2024-01-23 DIAGNOSIS — G56.21 LESION OF ULNAR NERVE, RIGHT UPPER LIMB: ICD-10-CM

## 2024-01-23 DIAGNOSIS — G56.01 CARPAL TUNNEL SYNDROME, RIGHT UPPER LIMB: ICD-10-CM

## 2024-01-23 DIAGNOSIS — Z98.890 OTHER SPECIFIED POSTPROCEDURAL STATES: Chronic | ICD-10-CM

## 2024-01-23 PROCEDURE — 29848 WRIST ENDOSCOPY/SURGERY: CPT | Mod: RT

## 2024-01-23 PROCEDURE — 64718 REVISE ULNAR NERVE AT ELBOW: CPT | Mod: RT

## 2024-01-23 RX ORDER — HYDROMORPHONE HYDROCHLORIDE 2 MG/ML
0.5 INJECTION INTRAMUSCULAR; INTRAVENOUS; SUBCUTANEOUS
Refills: 0 | Status: DISCONTINUED | OUTPATIENT
Start: 2024-01-23 | End: 2024-01-23

## 2024-01-23 RX ORDER — SODIUM CHLORIDE 9 MG/ML
1000 INJECTION, SOLUTION INTRAVENOUS
Refills: 0 | Status: DISCONTINUED | OUTPATIENT
Start: 2024-01-23 | End: 2024-01-23

## 2024-01-23 RX ORDER — APREPITANT 80 MG/1
40 CAPSULE ORAL ONCE
Refills: 0 | Status: COMPLETED | OUTPATIENT
Start: 2024-01-23 | End: 2024-01-23

## 2024-01-23 RX ORDER — HYDROMORPHONE HYDROCHLORIDE 2 MG/ML
1 INJECTION INTRAMUSCULAR; INTRAVENOUS; SUBCUTANEOUS
Refills: 0 | Status: DISCONTINUED | OUTPATIENT
Start: 2024-01-23 | End: 2024-01-23

## 2024-01-23 RX ORDER — CHLORHEXIDINE GLUCONATE 213 G/1000ML
1 SOLUTION TOPICAL ONCE
Refills: 0 | Status: COMPLETED | OUTPATIENT
Start: 2024-01-23 | End: 2024-01-23

## 2024-01-23 RX ORDER — IBUPROFEN 200 MG
1 TABLET ORAL
Refills: 0 | DISCHARGE

## 2024-01-23 RX ORDER — ACETAMINOPHEN 500 MG
2 TABLET ORAL
Qty: 0 | Refills: 0 | DISCHARGE

## 2024-01-23 RX ORDER — IBUPROFEN 200 MG
1 TABLET ORAL
Qty: 10 | Refills: 0
Start: 2024-01-23

## 2024-01-23 RX ADMIN — APREPITANT 40 MILLIGRAM(S): 80 CAPSULE ORAL at 08:29

## 2024-01-23 RX ADMIN — CHLORHEXIDINE GLUCONATE 1 APPLICATION(S): 213 SOLUTION TOPICAL at 08:29

## 2024-01-23 NOTE — BRIEF OPERATIVE NOTE - NSICDXBRIEFPREOP_GEN_ALL_CORE_FT
PRE-OP DIAGNOSIS:  Cubital tunnel syndrome, right 23-Jan-2024 11:23:28  Miguel Soriano  Right carpal tunnel syndrome 23-Jan-2024 11:23:17  Miguel Soriano

## 2024-01-23 NOTE — ASU DISCHARGE PLAN (ADULT/PEDIATRIC) - NS MD DC FALL RISK RISK
For information on Fall & Injury Prevention, visit: [email or web address] OR  [email or web address] OR  https://www.cdc.gov/steadi/patient.html

## 2024-01-23 NOTE — ASU DISCHARGE PLAN (ADULT/PEDIATRIC) - CARE PROVIDER_API CALL
Ivan Schroeder)  Surgery of the Hand  [address], [address]  [place] [address]  Phone: [phone]  Fax: [phone]  Scheduled Appointment: 01/29/2024

## 2024-01-23 NOTE — BRIEF OPERATIVE NOTE - NSICDXBRIEFPROCEDURE_GEN_ALL_CORE_FT
PROCEDURES:  Endoscopic carpal tunnel release 23-Jan-2024 11:21:47  Miguel Soriano  Release of ulnar nerve at cubital tunnel 23-Jan-2024 11:23:02  Miguel Soriano

## 2024-01-23 NOTE — BRIEF OPERATIVE NOTE - NSICDXBRIEFPOSTOP_GEN_ALL_CORE_FT
POST-OP DIAGNOSIS:  Right carpal tunnel syndrome 23-Jan-2024 11:23:44  Miguel Soriano  Cubital tunnel syndrome, right 23-Jan-2024 11:23:37  Miguel Soriano

## 2024-01-23 NOTE — ASU DISCHARGE PLAN (ADULT/PEDIATRIC) - ASU DC SPECIAL INSTRUCTIONSFT
DO NOT wet or remove the dressing.  Elevate the extremity to reduce swelling.  No strenuous activities or heavy lifting.  Sling for comfort.    Please follow Dr. Schroeder's instructions.    Follow up in the office on 1/29/24 in the [place] office.  Please call to confirm the appointment.

## 2024-01-24 PROBLEM — Z86.69 PERSONAL HISTORY OF OTHER DISEASES OF THE NERVOUS SYSTEM AND SENSE ORGANS: Chronic | Status: ACTIVE | Noted: 2024-01-10

## 2024-01-24 PROBLEM — Z87.828 PERSONAL HISTORY OF OTHER (HEALED) PHYSICAL INJURY AND TRAUMA: Chronic | Status: ACTIVE | Noted: 2024-01-10

## 2024-01-24 PROBLEM — U07.1 COVID-19: Chronic | Status: ACTIVE | Noted: 2024-01-10

## 2024-01-29 ENCOUNTER — APPOINTMENT (OUTPATIENT)
Dept: ORTHOPEDIC SURGERY | Facility: CLINIC | Age: 54
End: 2024-01-29
Payer: MEDICAID

## 2024-01-29 PROCEDURE — 99024 POSTOP FOLLOW-UP VISIT: CPT

## 2024-01-29 NOTE — END OF VISIT
[FreeTextEntry3] : This note was written by Lizandro Randle on 1/29/2024 acting solely as a scribe for Dr. Ivan Schroeder.   All medical record entries made by the Scribe were at my, Dr. Ivan Schroeder, direction and personally dictated by me on . I have personally reviewed the chart and agree that the record accurately reflects my personal performance of the history, physical exam, assessment and plan.

## 2024-01-29 NOTE — ADDENDUM
[FreeTextEntry1] : I, Lizandro Randle, acted solely as a scribe for Dr. Schroeder on this date on 1/29/2024.

## 2024-01-29 NOTE — HISTORY OF PRESENT ILLNESS
[de-identified] : 6 days postoperative. [de-identified] : 6 days status post endoscopic right carpal tunnel release and right in situ ulnar nerve release at the cubital tunnel.  Date of surgery: 1/23/2024.  She is doing well.  Her preoperative numbness and tingling are improved. [de-identified] : Examination of her right wrist hand and elbow after the dressings were removed demonstrates her incisions to be clean and dry.  There is no drainage or evidence of infection.  There is some swelling.  It appears as if the dressing was somewhat tight.  She has intact sensation to light touch distally throughout the digits. [de-identified] : Stable, 6 days postoperative. [de-identified] : The suture ends were cut and Steri-Strips were applied.  She was instructed on local wound care, when to begin scar massage and desensitization, range of motion exercises and will followup in 3 weeks.  The patient was instructed to return before then or to call the office if there are any problems in the interim.

## 2024-02-15 ENCOUNTER — NON-APPOINTMENT (OUTPATIENT)
Age: 54
End: 2024-02-15

## 2024-02-26 ENCOUNTER — APPOINTMENT (OUTPATIENT)
Dept: ORTHOPEDIC SURGERY | Facility: CLINIC | Age: 54
End: 2024-02-26
Payer: MEDICAID

## 2024-02-26 PROCEDURE — 99024 POSTOP FOLLOW-UP VISIT: CPT

## 2024-02-26 RX ORDER — MELOXICAM 15 MG/1
15 TABLET ORAL DAILY
Qty: 20 | Refills: 1 | Status: ACTIVE | COMMUNITY
Start: 2024-02-26 | End: 1900-01-01

## 2024-02-26 NOTE — ADDENDUM
[FreeTextEntry1] : I, Lizandro Randle, acted solely as a scribe for Dr. Schroeder on this date on 02/26/2024.

## 2024-02-26 NOTE — HISTORY OF PRESENT ILLNESS
[de-identified] : 34 postoperative. [de-identified] : 34 status post endoscopic right carpal tunnel release and right in situ ulnar nerve release at the cubital tunnel.  Date of surgery: 1/23/2024.  She is doing well. Her numbness and tingling have resolved. She still has difficulty making a full fist. She has been doing stretching exercises as instructed.  She was evaluated with a Georgian-speaking  today. [de-identified] : Examination of her right wrist hand and elbow demonstrates her incisions to be healing well.  There is decreased swelling.  She has some limitation of flexion and extension of the digits with some swelling of the digits.  She has intact sensation to light touch distally throughout the digits. [de-identified] : Stable, 34 days postoperative.  Her numbness and tingling have resolved but she does have some residual swelling of the hand as well as stiffness of the digits. [de-identified] : She was instructed on continued range of motion exercise and scar massage and desensitization.  I recommended she begin a course of Mobic 15 mg to be taken with breakfast. I warned about potential GI side effects. She was instructed to stop taking it if she experiences adverse side effects. I wrote her a script for hand therapy. She was instructed on warm soaks for her hand. She will follow-up in 1 month.

## 2024-02-26 NOTE — END OF VISIT
[FreeTextEntry3] : This note was written by Lizandro Randle on 02/26/2024 acting solely as a scribe for Dr. Ivan Schroeder.   All medical record entries made by the Scribe were at my, Dr. Ivan Schroeder, direction and personally dictated by me on 02/26/2024. I have personally reviewed the chart and agree that the record accurately reflects my personal performance of the history, physical exam, assessment and plan.

## 2024-03-15 ENCOUNTER — NON-APPOINTMENT (OUTPATIENT)
Age: 54
End: 2024-03-15

## 2024-03-25 ENCOUNTER — APPOINTMENT (OUTPATIENT)
Dept: ORTHOPEDIC SURGERY | Facility: CLINIC | Age: 54
End: 2024-03-25
Payer: COMMERCIAL

## 2024-03-25 PROCEDURE — 99024 POSTOP FOLLOW-UP VISIT: CPT

## 2024-03-25 NOTE — HISTORY OF PRESENT ILLNESS
[de-identified] : 62 postoperative. [de-identified] : Examination of her right wrist hand and elbow demonstrates her incisions to be healing well.  There is decreased swelling.  She has improved flexion and extension of the digits with some loss of terminal flexion of the middle finger.  There is very mild tenderness along the middle finger A1 pulley.  There is no triggering.  She has intact sensation to light touch distally along the radial, ulnar and median nerve distributions. [de-identified] : 62 status post endoscopic right carpal tunnel release and right in situ ulnar nerve release at the cubital tunnel.  Date of surgery: 1/23/2024.  She is in hand therapy.  She has also been prescribed meloxicam 15 mg daily.  She is doing well. She has been taking meloxicam daily and going to therapy, and she reports that her symptoms have greatly improved overall.   She was evaluated with a Persian-speaking  today. [de-identified] : Stable, 62 days postoperative. [de-identified] : She was instructed on continued range of motion exercise and scar massage and desensitization. I instructed her to take meloxicam as needed.  She will continue hand therapy, as it is benefiting her.  I also explained to the patient that she may have a tendonitis at her right middle finger. She will follow up in 4 weeks for reevaluation. If the tendonitis at her right middle finger is no better at that time, we will discuss a possible cortisone injection at that finger.

## 2024-03-25 NOTE — ADDENDUM
[FreeTextEntry1] : I, Lizandro Randle, acted solely as a scribe for Dr. Schroeder on this date on 3/25/24.

## 2024-03-25 NOTE — END OF VISIT
[FreeTextEntry3] : This note was written by Lizandro Randle on 3/25/24 acting solely as a scribe for Dr. Ivan Schroeder.   All medical record entries made by the Scribe were at my, Dr. Ivan Schroeder, direction and personally dictated by me on 3/25/24. I have personally reviewed the chart and agree that the record accurately reflects my personal performance of the history, physical exam, assessment and plan.
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
## 2024-04-11 ENCOUNTER — NON-APPOINTMENT (OUTPATIENT)
Age: 54
End: 2024-04-11

## 2024-04-15 PROBLEM — G56.01 CARPAL TUNNEL SYNDROME OF RIGHT WRIST: Status: ACTIVE | Noted: 2023-11-13

## 2024-04-15 PROBLEM — G56.21 CUBITAL TUNNEL SYNDROME ON RIGHT: Status: ACTIVE | Noted: 2023-12-11

## 2024-04-22 ENCOUNTER — APPOINTMENT (OUTPATIENT)
Dept: ORTHOPEDIC SURGERY | Facility: CLINIC | Age: 54
End: 2024-04-22
Payer: COMMERCIAL

## 2024-04-22 DIAGNOSIS — G56.21 LESION OF ULNAR NERVE, RIGHT UPPER LIMB: ICD-10-CM

## 2024-04-22 DIAGNOSIS — G56.01 CARPAL TUNNEL SYNDROME, RIGHT UPPER LIMB: ICD-10-CM

## 2024-04-22 PROCEDURE — 99213 OFFICE O/P EST LOW 20 MIN: CPT | Mod: 24

## 2024-04-22 PROCEDURE — 99212 OFFICE O/P EST SF 10 MIN: CPT | Mod: 24

## 2024-04-22 NOTE — HISTORY OF PRESENT ILLNESS
[de-identified] : 90 days postoperative. [de-identified] : 90 days status post endoscopic right carpal tunnel release and right in situ ulnar nerve release at the cubital tunnel.  Date of surgery: 1/23/2024.  She finished up her course of hand therapy.  She is doing overall well and no longer has numbness or tingling.  She has some residual pain at the middle finger which is mild.  She was evaluated with a Welsh-speaking  today. [de-identified] : Examination of her right wrist hand and elbow demonstrates her incisions to be well-healed.  There is no residual swelling.  She has regained flexion and extension of the digits.  There is no tenderness along the middle finger A1 pulley but there is mild tenderness along the middle finger PIP joint.  She has intact sensation to light touch distally along the radial, ulnar and median nerve distributions. [de-identified] : Stable, 90 days postoperative. [de-identified] : She was instructed on continued range of motion exercise and scar massage and desensitization.  She will follow-up on an as-needed basis.  With regard to her mild right middle finger pain, I told her that this may be secondary to mild arthritis or tendinitis.  As her symptoms are mild, I recommend observation and anti-inflammatory agents as needed.  She will follow-up if her symptoms worsen in this regard.

## 2024-06-03 NOTE — ASU DISCHARGE PLAN (ADULT/PEDIATRIC) - DISCHARGE PLAN IS COMPLETE AND GIVEN TO PATIENT
It looks like there are refills on this medication. On 03- Mame sent in #90 R-3 to [facility] in [place], [place].   : Yes

## 2024-06-20 NOTE — ASU PREOP CHECKLIST - ANTIBIOTIC
Contacted Dr Kelley, Juan Pablo, & Cleve to clarify on need for IR L nephrostomy exchange. NO nephrostomy present on arrival. Reviewed post op note from 5/24, urology internalized and will manage patient care. No need to proceed with new nephrostomy/exchange today as requested. Updated patient on POC and attempting to contact ride from NH for pt to return to facility.    
ancef/yes

## 2024-08-12 ENCOUNTER — APPOINTMENT (OUTPATIENT)
Dept: MAMMOGRAPHY | Facility: CLINIC | Age: 54
End: 2024-08-12

## 2024-08-26 ENCOUNTER — APPOINTMENT (OUTPATIENT)
Dept: MAMMOGRAPHY | Facility: CLINIC | Age: 54
End: 2024-08-26
Payer: COMMERCIAL

## 2024-08-26 ENCOUNTER — RESULT REVIEW (OUTPATIENT)
Age: 54
End: 2024-08-26

## 2024-08-26 ENCOUNTER — OUTPATIENT (OUTPATIENT)
Dept: OUTPATIENT SERVICES | Facility: HOSPITAL | Age: 54
LOS: 1 days | End: 2024-08-26
Payer: COMMERCIAL

## 2024-08-26 DIAGNOSIS — Z98.890 OTHER SPECIFIED POSTPROCEDURAL STATES: Chronic | ICD-10-CM

## 2024-08-26 DIAGNOSIS — Z12.31 ENCOUNTER FOR SCREENING MAMMOGRAM FOR MALIGNANT NEOPLASM OF BREAST: ICD-10-CM

## 2024-08-26 PROCEDURE — 77067 SCR MAMMO BI INCL CAD: CPT | Mod: 26

## 2024-08-26 PROCEDURE — 77063 BREAST TOMOSYNTHESIS BI: CPT

## 2024-08-26 PROCEDURE — 77067 SCR MAMMO BI INCL CAD: CPT

## 2024-08-26 PROCEDURE — 77063 BREAST TOMOSYNTHESIS BI: CPT | Mod: 26

## 2025-04-11 ENCOUNTER — APPOINTMENT (OUTPATIENT)
Dept: RADIOLOGY | Facility: CLINIC | Age: 55
End: 2025-04-11
Payer: COMMERCIAL

## 2025-04-11 ENCOUNTER — OUTPATIENT (OUTPATIENT)
Dept: OUTPATIENT SERVICES | Facility: HOSPITAL | Age: 55
LOS: 1 days | End: 2025-04-11
Payer: COMMERCIAL

## 2025-04-11 DIAGNOSIS — Z98.890 OTHER SPECIFIED POSTPROCEDURAL STATES: Chronic | ICD-10-CM

## 2025-04-11 DIAGNOSIS — Z00.8 ENCOUNTER FOR OTHER GENERAL EXAMINATION: ICD-10-CM

## 2025-04-11 DIAGNOSIS — M25.561 PAIN IN RIGHT KNEE: ICD-10-CM

## 2025-04-11 PROCEDURE — 73562 X-RAY EXAM OF KNEE 3: CPT | Mod: 26,RT

## 2025-04-11 PROCEDURE — 73562 X-RAY EXAM OF KNEE 3: CPT

## 2025-08-05 VITALS — BODY MASS INDEX: 31.08 KG/M2 | HEIGHT: 67 IN | WEIGHT: 198 LBS

## 2025-08-05 DIAGNOSIS — Z12.11 ENCOUNTER FOR SCREENING FOR MALIGNANT NEOPLASM OF COLON: ICD-10-CM

## 2025-08-11 ENCOUNTER — NON-APPOINTMENT (OUTPATIENT)
Age: 55
End: 2025-08-11

## 2025-08-13 ENCOUNTER — OUTPATIENT (OUTPATIENT)
Dept: OUTPATIENT SERVICES | Facility: HOSPITAL | Age: 55
LOS: 1 days | End: 2025-08-13
Payer: SELF-PAY

## 2025-08-13 ENCOUNTER — APPOINTMENT (OUTPATIENT)
Dept: ORTHOPEDIC SURGERY | Facility: HOSPITAL | Age: 55
End: 2025-08-13

## 2025-08-13 VITALS
DIASTOLIC BLOOD PRESSURE: 83 MMHG | RESPIRATION RATE: 18 BRPM | HEART RATE: 68 BPM | OXYGEN SATURATION: 97 % | TEMPERATURE: 98.24 F | SYSTOLIC BLOOD PRESSURE: 138 MMHG

## 2025-08-13 DIAGNOSIS — Z98.890 OTHER SPECIFIED POSTPROCEDURAL STATES: Chronic | ICD-10-CM

## 2025-08-13 DIAGNOSIS — M25.269: ICD-10-CM

## 2025-08-13 DIAGNOSIS — M25.569 PAIN IN UNSPECIFIED KNEE: ICD-10-CM

## 2025-08-13 DIAGNOSIS — M79.9 SOFT TISSUE DISORDER, UNSPECIFIED: ICD-10-CM

## 2025-08-13 PROCEDURE — G0463: CPT

## 2025-08-13 RX ORDER — MELOXICAM 15 MG/1
15 TABLET ORAL
Qty: 20 | Refills: 0 | Status: ACTIVE | COMMUNITY
Start: 2025-08-13 | End: 1900-01-01

## 2025-08-13 RX ORDER — LEVOTHYROXINE SODIUM 0.03 MG/1
25 TABLET ORAL DAILY
Refills: 0 | Status: ACTIVE | COMMUNITY
Start: 2025-08-13

## 2025-09-18 ENCOUNTER — APPOINTMENT (OUTPATIENT)
Dept: MAMMOGRAPHY | Facility: CLINIC | Age: 55
End: 2025-09-18
Payer: COMMERCIAL

## 2025-09-18 PROCEDURE — 77063 BREAST TOMOSYNTHESIS BI: CPT | Mod: 26

## 2025-09-18 PROCEDURE — 77067 SCR MAMMO BI INCL CAD: CPT | Mod: 26

## (undated) DEVICE — SLING ARM CHIEFTAIN MESH LARGE

## (undated) DEVICE — DRSG KLING 3"

## (undated) DEVICE — VENODYNE/SCD SLEEVE CALF MEDIUM

## (undated) DEVICE — DRSG WEBRIL 3"

## (undated) DEVICE — SUT MONOCRYL 5-0 18" P-3 UNDYED

## (undated) DEVICE — PACK UPPER EXTREMITY

## (undated) DEVICE — DRSG STERISTRIPS 0.5 X 4"

## (undated) DEVICE — DRAPE 3/4 SHEET 52X76"

## (undated) DEVICE — DRAPE TOWEL BLUE 17" X 24"

## (undated) DEVICE — TOURNIQUET ESMARK 4"

## (undated) DEVICE — GLV 7.5 PROTEXIS (BLUE)

## (undated) DEVICE — SYS ENDO STRATOS RELEASE 30 DEG 4MM

## (undated) DEVICE — BLADE CARPAL TUNNEL SNGL

## (undated) DEVICE — POSITIONER STRAP ARMBOARD VELCRO TS-30

## (undated) DEVICE — POSITIONER FOAM HEAD CRADLE (PINK)

## (undated) DEVICE — NDL HYPO REGULAR BEVEL 25G X 1.5" (BLUE)

## (undated) DEVICE — DRSG KLING 4"

## (undated) DEVICE — GLV 7 PROTEXIS (WHITE)

## (undated) DEVICE — SPECIMEN CONTAINER PET

## (undated) DEVICE — WARMING BLANKET LOWER ADULT

## (undated) DEVICE — SLING ARM CHIEFTAIN MESH MEDIUM

## (undated) DEVICE — TOURNIQUET CUFF 18" DUAL PORT SINGLE BLADDER W PLC  (BLACK)